# Patient Record
Sex: FEMALE | Race: WHITE | Employment: PART TIME | URBAN - METROPOLITAN AREA
[De-identification: names, ages, dates, MRNs, and addresses within clinical notes are randomized per-mention and may not be internally consistent; named-entity substitution may affect disease eponyms.]

---

## 2017-10-28 PROCEDURE — 87086 URINE CULTURE/COLONY COUNT: CPT | Performed by: FAMILY MEDICINE

## 2018-03-13 PROBLEM — Z86.19 HISTORY OF HERPES SIMPLEX INFECTION: Status: ACTIVE | Noted: 2018-03-13

## 2018-04-19 PROCEDURE — 88175 CYTOPATH C/V AUTO FLUID REDO: CPT | Performed by: INTERNAL MEDICINE

## 2018-04-19 PROCEDURE — 87624 HPV HI-RISK TYP POOLED RSLT: CPT | Performed by: INTERNAL MEDICINE

## 2018-11-19 ENCOUNTER — OFFICE VISIT (OUTPATIENT)
Dept: FAMILY MEDICINE CLINIC | Facility: CLINIC | Age: 43
End: 2018-11-19
Payer: COMMERCIAL

## 2018-11-19 DIAGNOSIS — Z11.1 SCREENING-PULMONARY TB: Primary | ICD-10-CM

## 2018-11-19 PROCEDURE — 86580 TB INTRADERMAL TEST: CPT | Performed by: NURSE PRACTITIONER

## 2018-11-19 NOTE — PATIENT INSTRUCTIONS
You will need to return to clinic in 48-72 hours to have results of TB test read. Please return to clinic on 11/21/2018 after 11:15 or until 4:00 pm. to have your TB test read.     If you do not return during this time your test will need to be repeated

## 2018-11-19 NOTE — PROGRESS NOTES
Gabriel Francois is a 39year old female who presents for TB testing. TUBERCULOSIS SCREENING QUESTIONNAIRE    · Live vaccines in the past month? no  · Any steroid medication in the past month? no  · History of BCG vaccine?     no  · If female, are you

## 2018-11-21 ENCOUNTER — OFFICE VISIT (OUTPATIENT)
Dept: FAMILY MEDICINE CLINIC | Facility: CLINIC | Age: 43
End: 2018-11-21

## 2018-11-21 DIAGNOSIS — Z11.1 ENCOUNTER FOR PPD SKIN TEST READING: Primary | ICD-10-CM

## 2018-11-21 NOTE — PROGRESS NOTES
Patient here for TB skin test read. The results were negative, 0 mm, no induration. Date given: 11/19/18  Date read: 11/21/18  Time Read: 12:10 PM    The patient had npo questions of concerns. Instructed her to follow up as needed.

## 2018-11-26 ENCOUNTER — OFFICE VISIT (OUTPATIENT)
Dept: FAMILY MEDICINE CLINIC | Facility: CLINIC | Age: 43
End: 2018-11-26
Payer: COMMERCIAL

## 2018-11-26 VITALS
DIASTOLIC BLOOD PRESSURE: 66 MMHG | SYSTOLIC BLOOD PRESSURE: 104 MMHG | HEART RATE: 76 BPM | RESPIRATION RATE: 16 BRPM | TEMPERATURE: 98 F

## 2018-11-26 DIAGNOSIS — N30.01 ACUTE CYSTITIS WITH HEMATURIA: Primary | ICD-10-CM

## 2018-11-26 PROCEDURE — 99213 OFFICE O/P EST LOW 20 MIN: CPT | Performed by: NURSE PRACTITIONER

## 2018-11-26 PROCEDURE — 87086 URINE CULTURE/COLONY COUNT: CPT | Performed by: NURSE PRACTITIONER

## 2018-11-26 RX ORDER — PHENAZOPYRIDINE HYDROCHLORIDE 200 MG/1
TABLET, FILM COATED ORAL
Qty: 6 TABLET | Refills: 0 | Status: SHIPPED | OUTPATIENT
Start: 2018-11-26 | End: 2019-09-06 | Stop reason: ALTCHOICE

## 2018-11-26 RX ORDER — NITROFURANTOIN 25; 75 MG/1; MG/1
CAPSULE ORAL
Qty: 14 CAPSULE | Refills: 0 | Status: SHIPPED | OUTPATIENT
Start: 2018-11-26 | End: 2019-09-06 | Stop reason: ALTCHOICE

## 2018-11-27 NOTE — PROGRESS NOTES
CHIEF COMPLAINT:   Patient presents with:  UTI    HPI:   Marc Mathur is a 39year old female who presents with symptoms of UTI. Patient states that she developed urinary urgency, frequency and burning 2 days ago. The urgency is extreme.  She also has : No suprapubic tenderness. No bladder distention, or CVAT     ASSESSMENT AND PLAN:   Shirlene Farr is a 39year old female presents with UTI symptoms.     Acute cystitis with hematuria  (primary encounter diagnosis)    Orders Placed This Encounter The infection causes inflammation in the urethra and bladder. This causes many of the symptoms.  The most common symptoms of a bladder infection are:  · Pain or burning when urinating  · Having to urinate more often than usual  · Urgent need to urinate  · O · You can use acetaminophen or ibuprofen for pain, fever, or discomfort, unless another medicine was prescribed. If you have chronic liver or kidney disease, talk with your healthcare provider before using these medicines.  Also talk with your provider if y · Fainting or loss of consciousness  · Rapid heart rate  When to seek medical advice  Call your healthcare provider right away if any of these occur:  · Fever of 100.4ºF (38. 0ºC) or higher, or as directed by your healthcare provider  · Symptoms are not bet

## 2019-09-03 PROCEDURE — 88305 TISSUE EXAM BY PATHOLOGIST: CPT | Performed by: RADIOLOGY

## 2019-09-03 PROCEDURE — 88344 IMHCHEM/IMCYTCHM EA MLT ANTB: CPT | Performed by: RADIOLOGY

## 2019-09-03 PROCEDURE — 88360 TUMOR IMMUNOHISTOCHEM/MANUAL: CPT | Performed by: RADIOLOGY

## 2019-09-06 ENCOUNTER — OFFICE VISIT (OUTPATIENT)
Dept: SURGERY | Facility: CLINIC | Age: 44
End: 2019-09-06
Payer: COMMERCIAL

## 2019-09-06 ENCOUNTER — GENETICS ENCOUNTER (OUTPATIENT)
Dept: GENETICS | Facility: HOSPITAL | Age: 44
End: 2019-09-06
Attending: SURGERY
Payer: COMMERCIAL

## 2019-09-06 ENCOUNTER — NURSE NAVIGATOR ENCOUNTER (OUTPATIENT)
Dept: HEMATOLOGY/ONCOLOGY | Facility: HOSPITAL | Age: 44
End: 2019-09-06

## 2019-09-06 ENCOUNTER — NURSE ONLY (OUTPATIENT)
Dept: HEMATOLOGY/ONCOLOGY | Facility: HOSPITAL | Age: 44
End: 2019-09-06
Attending: SURGERY
Payer: COMMERCIAL

## 2019-09-06 VITALS
OXYGEN SATURATION: 100 % | DIASTOLIC BLOOD PRESSURE: 79 MMHG | BODY MASS INDEX: 23.72 KG/M2 | HEART RATE: 72 BPM | SYSTOLIC BLOOD PRESSURE: 122 MMHG | RESPIRATION RATE: 16 BRPM | WEIGHT: 142.38 LBS | HEIGHT: 65 IN

## 2019-09-06 DIAGNOSIS — Z17.0 MALIGNANT NEOPLASM OF UPPER-OUTER QUADRANT OF LEFT BREAST IN FEMALE, ESTROGEN RECEPTOR POSITIVE (HCC): Primary | ICD-10-CM

## 2019-09-06 DIAGNOSIS — C50.412 MALIGNANT NEOPLASM OF UPPER-OUTER QUADRANT OF LEFT BREAST IN FEMALE, ESTROGEN RECEPTOR POSITIVE (HCC): Primary | ICD-10-CM

## 2019-09-06 PROCEDURE — 99205 OFFICE O/P NEW HI 60 MIN: CPT | Performed by: SURGERY

## 2019-09-06 PROCEDURE — 96040 HC GENETIC COUNSELING EA 30 MIN: CPT | Performed by: GENETIC COUNSELOR, MS

## 2019-09-06 PROCEDURE — 36415 COLL VENOUS BLD VENIPUNCTURE: CPT

## 2019-09-06 NOTE — PROGRESS NOTES
Referring Provider: Ambika Parker MD    Additional Provider: Keith Cheema MD    Reason for Referral:  Gera Fregoso was referred for genetic counseling because of a new diagnosis of breast cancer and a family history of breast cancer.   Ms. Lydia Stone the genes, BRCA1 and BRCA2, account for the majority of hereditary breast and ovarian cancer families.   Mutations in genes other than BRCA1/2, many of which now have medical management recommendations (e.g., CHEK2, MICA, RAD51C, RAD51D, PALB2) are identifie at increased risk would depend on the gene involved. Medical recommendations for individuals with BRCA1/2 pathogenic variants were reviewed as an example (see below).  It was also explained that for some of the genes for which testing is available, the asso and family history of breast cancer. Her reported family history is not highly suspicious for a hereditary cancer syndrome but is limited in that her father is an only child and her mother’s only sibling  in his 35s. Genetic testing on Ms. Lino fo

## 2019-09-09 ENCOUNTER — TELEPHONE (OUTPATIENT)
Dept: HEMATOLOGY/ONCOLOGY | Facility: HOSPITAL | Age: 44
End: 2019-09-09

## 2019-09-09 NOTE — TELEPHONE ENCOUNTER
Message left for patient asking that she return call to Breast RN Navigator concerning her inquiry regarding Medical Oncology at [address] [place] [address].

## 2019-09-10 ENCOUNTER — HOSPITAL ENCOUNTER (OUTPATIENT)
Dept: MRI IMAGING | Facility: HOSPITAL | Age: 44
Discharge: HOME | End: 2019-09-10
Attending: SURGERY
Payer: COMMERCIAL

## 2019-09-10 ENCOUNTER — OFFICE VISIT (OUTPATIENT)
Dept: SURGERY | Facility: CLINIC | Age: 44
End: 2019-09-10
Payer: COMMERCIAL

## 2019-09-10 DIAGNOSIS — C50.912 MALIGNANT NEOPLASM OF LEFT FEMALE BREAST, UNSPECIFIED ESTROGEN RECEPTOR STATUS, UNSPECIFIED SITE OF BREAST (HCC): Primary | ICD-10-CM

## 2019-09-10 DIAGNOSIS — C50.412 MALIGNANT NEOPLASM OF UPPER-OUTER QUADRANT OF LEFT BREAST IN FEMALE, ESTROGEN RECEPTOR POSITIVE (HCC): ICD-10-CM

## 2019-09-10 DIAGNOSIS — Z17.0 MALIGNANT NEOPLASM OF UPPER-OUTER QUADRANT OF LEFT BREAST IN FEMALE, ESTROGEN RECEPTOR POSITIVE (HCC): ICD-10-CM

## 2019-09-10 PROCEDURE — A9575 INJ GADOTERATE MEGLUMI 0.1ML: HCPCS | Performed by: SURGERY

## 2019-09-10 PROCEDURE — 77049 MRI BREAST C-+ W/CAD BI: CPT | Performed by: SURGERY

## 2019-09-10 PROCEDURE — 99203 OFFICE O/P NEW LOW 30 MIN: CPT | Performed by: SURGERY

## 2019-09-10 NOTE — CONSULTS
New Patient Consultation    This is the first visit for this 38year old female who presents to discuss reconstructive options following surgery for breast cancer. History of Present Illness:    The patient is a 38year old female who presents with a lef Review of Systems:    General:   The patient denies, fever, chills, night sweats, +fatigue, generalized weakness, change in appetite, weight loss, or weight gain. Endocrine:   There is no history of poor/slow wound healing, weight loss/gain, fe aches/pain, joint pain, stiff joints, neck pain, back pain or bone pain.     Neurologic:  There is no history of migraines or severe headaches, seizure/epilepsy, speech problems, coordination problems, trembling/tremors, fainting/black outs, dizziness, tomasa appears normal. There are no suspicious appearing rashes or lesions. Extremities: The extremities are without deformity, cyanosis or edema.     Impression:   The patient is a 38year old female who presents with a left breast cancer is considering bilate operative findings. She also understand the possible need for revision surgery if direct to implant reconstruction is undertaken.  The expected post-operative course was discussed including the need for activity limitation, drains, and suture removal.  We d

## 2019-09-12 ENCOUNTER — TELEPHONE (OUTPATIENT)
Dept: SURGERY | Facility: CLINIC | Age: 44
End: 2019-09-12

## 2019-09-12 NOTE — TELEPHONE ENCOUNTER
Pt phoned in upset about the [facility] imaging needed for her MRI read. I explained to her that we requested them the initial day of her consultation, and have been in contact for the request multiple times.    I let her know Gomez Look spoke to someone today, monique meek

## 2019-09-16 ENCOUNTER — TELEPHONE (OUTPATIENT)
Dept: SURGERY | Facility: CLINIC | Age: 44
End: 2019-09-16

## 2019-09-16 NOTE — TELEPHONE ENCOUNTER
DEREKM returning her call regarding the MRI result. I let her know we don't have the results yet, but I did confirm with radiology they have the [facility] images they needed, and expect the result hopefully by tomorrow.    I let her know we would call as soon as w

## 2019-09-18 ENCOUNTER — TELEPHONE (OUTPATIENT)
Dept: SURGERY | Facility: CLINIC | Age: 44
End: 2019-09-18

## 2019-09-18 ENCOUNTER — GENETICS ENCOUNTER (OUTPATIENT)
Dept: HEMATOLOGY/ONCOLOGY | Facility: HOSPITAL | Age: 44
End: 2019-09-18

## 2019-09-18 NOTE — TELEPHONE ENCOUNTER
Pt's questions addressed regarding the chest muscle. I let her know I confirmed with Dr Angel Shirley, and it is not growing into the chest muscle. There is a plane between the mass and her chest wall. Pt verbalized understanding.    Other questions Tigist Marcano

## 2019-09-18 NOTE — TELEPHONE ENCOUNTER
I contacted the patient regarding the MRI result, and the message from Dr Ashly Pacheco. Reviewed that with the findings, Dr Ashly Pacheco recommends a mastectomy on the left to be safest.  Her questions addressed regarding reconstruction. She had questions about the kin

## 2019-09-18 NOTE — PROGRESS NOTES
Referring Provider:       Tierra Davies MD     Additional Provider:     Emy Moya MD    Reason for Referral:  Kathryn Hadley had genetic testing performed on 9/6/19 because of a new diagnosis of breast cancer.      Genetic Testing Result:  No pat SMAD4, SMARCA4, STK11, TP53, TSC1, TSC2, and VHL. Please refer to the report from [facility] for additional testing information. These results were phoned to Ms. Lino on 9/24/19. The etiology of Ms. Lino’s breast cancer remains unexplained.  The l

## 2019-09-20 ENCOUNTER — TELEPHONE (OUTPATIENT)
Dept: HEMATOLOGY/ONCOLOGY | Facility: HOSPITAL | Age: 44
End: 2019-09-20

## 2019-09-20 ENCOUNTER — TELEPHONE (OUTPATIENT)
Dept: SURGERY | Facility: CLINIC | Age: 44
End: 2019-09-20

## 2019-09-20 DIAGNOSIS — Z17.0 MALIGNANT NEOPLASM OF UPPER-OUTER QUADRANT OF LEFT BREAST IN FEMALE, ESTROGEN RECEPTOR POSITIVE (HCC): ICD-10-CM

## 2019-09-20 DIAGNOSIS — C50.912 MALIGNANT NEOPLASM OF LEFT FEMALE BREAST, UNSPECIFIED ESTROGEN RECEPTOR STATUS, UNSPECIFIED SITE OF BREAST (HCC): Primary | ICD-10-CM

## 2019-09-20 DIAGNOSIS — C50.412 MALIGNANT NEOPLASM OF UPPER-OUTER QUADRANT OF LEFT BREAST IN FEMALE, ESTROGEN RECEPTOR POSITIVE (HCC): ICD-10-CM

## 2019-09-20 PROBLEM — C50.812 MALIGNANT NEOPLASM OF OVERLAPPING SITES OF LEFT BREAST IN FEMALE, ESTROGEN RECEPTOR POSITIVE: Status: ACTIVE | Noted: 2019-09-20

## 2019-09-20 PROBLEM — C50.812 MALIGNANT NEOPLASM OF OVERLAPPING SITES OF LEFT BREAST IN FEMALE, ESTROGEN RECEPTOR POSITIVE (HCC): Status: ACTIVE | Noted: 2019-09-20

## 2019-09-20 NOTE — TELEPHONE ENCOUNTER
I contacted the patient to offer Oct 10 at [facility].   She is seeing Katarzyna Aquino on Monday for her pre op with plastics, and will call her PCP now that she has a date for surgery. Questions addressed regarding recovery, and talked about drains.    Emotional sup

## 2019-09-20 NOTE — TELEPHONE ENCOUNTER
Spoke with patient regarding upcoming surgical procedure. Pt states she is going to proceed with B mastectomy with reconstruction, she may want to go up in cup size with reconstruction.  Encouraged pt to discuss this with plastic surgery, she is meeting wit

## 2019-09-23 ENCOUNTER — TELEPHONE (OUTPATIENT)
Dept: SURGERY | Facility: CLINIC | Age: 44
End: 2019-09-23

## 2019-09-23 ENCOUNTER — OFFICE VISIT (OUTPATIENT)
Dept: SURGERY | Facility: CLINIC | Age: 44
End: 2019-09-23
Payer: COMMERCIAL

## 2019-09-23 DIAGNOSIS — C50.912 MALIGNANT NEOPLASM OF LEFT FEMALE BREAST, UNSPECIFIED ESTROGEN RECEPTOR STATUS, UNSPECIFIED SITE OF BREAST (HCC): Primary | ICD-10-CM

## 2019-09-23 DIAGNOSIS — Z17.0 MALIGNANT NEOPLASM OF OVERLAPPING SITES OF LEFT BREAST IN FEMALE, ESTROGEN RECEPTOR POSITIVE (HCC): Primary | ICD-10-CM

## 2019-09-23 DIAGNOSIS — C50.812 MALIGNANT NEOPLASM OF OVERLAPPING SITES OF LEFT BREAST IN FEMALE, ESTROGEN RECEPTOR POSITIVE (HCC): Primary | ICD-10-CM

## 2019-09-23 PROCEDURE — 99212 OFFICE O/P EST SF 10 MIN: CPT | Performed by: PHYSICIAN ASSISTANT

## 2019-09-23 NOTE — PATIENT INSTRUCTIONS
Surgeon:         Dr. Nazia Payan                                        Tel:        [phone]                                  Fax:        [phone]    Surgery/Procedure:                              Bilateral nipple sparing mastectomy and left sent

## 2019-09-23 NOTE — PROGRESS NOTES
This is a 26-year-old female who presents today for preoperative discussion of her upcoming mastectomy and reconstruction. Dr. Bharath Higginbotham is her breast surgeon and the patient has now chosen to undergo bilateral mastectomy.   She has a known left breast cancer deemed oncologically safe by Dr. Arlet Pham then this would be fine with us.   We spent time talking about implant based reconstruction as it was already previously discussed at her consult visit that she is not a good candidate for autologous reconstruction du

## 2019-09-23 NOTE — TELEPHONE ENCOUNTER
Pt phoned in with questions about her nipple. I let her know if it looks close, they will sample it in the OR. Also depends on her anatomy regarding leaving her nipple.    She also had questions about requesting an anesthesiologist. I let her know I don't

## 2019-10-10 ENCOUNTER — HOSPITAL ENCOUNTER (OUTPATIENT)
Dept: NUCLEAR MEDICINE | Facility: HOSPITAL | Age: 44
Discharge: HOME | End: 2019-10-10
Attending: SURGERY
Payer: COMMERCIAL

## 2019-10-10 ENCOUNTER — ANESTHESIA (OUTPATIENT)
Dept: SURGERY | Facility: HOSPITAL | Age: 44
End: 2019-10-10
Payer: COMMERCIAL

## 2019-10-10 ENCOUNTER — ANESTHESIA EVENT (OUTPATIENT)
Dept: SURGERY | Facility: HOSPITAL | Age: 44
End: 2019-10-10
Payer: COMMERCIAL

## 2019-10-10 ENCOUNTER — HOSPITAL ENCOUNTER (OUTPATIENT)
Facility: HOSPITAL | Age: 44
Discharge: HOME | End: 2019-10-11
Attending: SURGERY | Admitting: SURGERY
Payer: COMMERCIAL

## 2019-10-10 DIAGNOSIS — Z17.0 MALIGNANT NEOPLASM OF UPPER-OUTER QUADRANT OF LEFT BREAST IN FEMALE, ESTROGEN RECEPTOR POSITIVE (HCC): ICD-10-CM

## 2019-10-10 DIAGNOSIS — C50.912 MALIGNANT NEOPLASM OF LEFT FEMALE BREAST, UNSPECIFIED ESTROGEN RECEPTOR STATUS, UNSPECIFIED SITE OF BREAST (HCC): ICD-10-CM

## 2019-10-10 DIAGNOSIS — C50.412 MALIGNANT NEOPLASM OF UPPER-OUTER QUADRANT OF LEFT BREAST IN FEMALE, ESTROGEN RECEPTOR POSITIVE (HCC): ICD-10-CM

## 2019-10-10 DIAGNOSIS — C50.412 MALIGNANT NEOPLASM OF UPPER-OUTER QUADRANT OF LEFT FEMALE BREAST, UNSPECIFIED ESTROGEN RECEPTOR STATUS (HCC): ICD-10-CM

## 2019-10-10 PROCEDURE — 88309 TISSUE EXAM BY PATHOLOGIST: CPT | Performed by: SURGERY

## 2019-10-10 PROCEDURE — 88307 TISSUE EXAM BY PATHOLOGIST: CPT | Performed by: SURGERY

## 2019-10-10 PROCEDURE — 88332 PATH CONSLTJ SURG EA ADD BLK: CPT | Performed by: SURGERY

## 2019-10-10 PROCEDURE — 88331 PATH CONSLTJ SURG 1 BLK 1SPC: CPT | Performed by: SURGERY

## 2019-10-10 PROCEDURE — 88342 IMHCHEM/IMCYTCHM 1ST ANTB: CPT | Performed by: SURGERY

## 2019-10-10 PROCEDURE — 78195 LYMPH SYSTEM IMAGING: CPT | Performed by: SURGERY

## 2019-10-10 PROCEDURE — 3E0W3KZ INTRODUCTION OF OTHER DIAGNOSTIC SUBSTANCE INTO LYMPHATICS, PERCUTANEOUS APPROACH: ICD-10-PCS | Performed by: SURGERY

## 2019-10-10 PROCEDURE — 0HHV0NZ INSERTION OF TISSUE EXPANDER INTO BILATERAL BREAST, OPEN APPROACH: ICD-10-PCS | Performed by: SURGERY

## 2019-10-10 PROCEDURE — 88341 IMHCHEM/IMCYTCHM EA ADD ANTB: CPT | Performed by: SURGERY

## 2019-10-10 PROCEDURE — 88334 PATH CONSLTJ SURG CYTO XM EA: CPT | Performed by: SURGERY

## 2019-10-10 PROCEDURE — 0HUV0KZ SUPPLEMENT BILATERAL BREAST WITH NONAUTOLOGOUS TISSUE SUBSTITUTE, OPEN APPROACH: ICD-10-PCS | Performed by: SURGERY

## 2019-10-10 PROCEDURE — 88360 TUMOR IMMUNOHISTOCHEM/MANUAL: CPT | Performed by: SURGERY

## 2019-10-10 PROCEDURE — 81025 URINE PREGNANCY TEST: CPT

## 2019-10-10 PROCEDURE — 0HTV0ZZ RESECTION OF BILATERAL BREAST, OPEN APPROACH: ICD-10-PCS | Performed by: SURGERY

## 2019-10-10 PROCEDURE — 88363 XM ARCHIVE TISSUE MOLEC ANAL: CPT | Performed by: SURGERY

## 2019-10-10 PROCEDURE — 88305 TISSUE EXAM BY PATHOLOGIST: CPT | Performed by: SURGERY

## 2019-10-10 PROCEDURE — 07T60ZZ RESECTION OF LEFT AXILLARY LYMPHATIC, OPEN APPROACH: ICD-10-PCS | Performed by: SURGERY

## 2019-10-10 DEVICE — MTRX ALDRM SLCT TISS THK.4-1.6: Type: IMPLANTABLE DEVICE | Status: FUNCTIONAL

## 2019-10-10 RX ORDER — MORPHINE SULFATE 4 MG/ML
2 INJECTION, SOLUTION INTRAMUSCULAR; INTRAVENOUS EVERY 10 MIN PRN
Status: DISCONTINUED | OUTPATIENT
Start: 2019-10-10 | End: 2019-10-10 | Stop reason: HOSPADM

## 2019-10-10 RX ORDER — HALOPERIDOL 5 MG/ML
0.25 INJECTION INTRAMUSCULAR ONCE AS NEEDED
Status: DISCONTINUED | OUTPATIENT
Start: 2019-10-10 | End: 2019-10-10 | Stop reason: HOSPADM

## 2019-10-10 RX ORDER — FLUOXETINE HYDROCHLORIDE 20 MG/1
20 CAPSULE ORAL DAILY
Status: DISCONTINUED | OUTPATIENT
Start: 2019-10-10 | End: 2019-10-11

## 2019-10-10 RX ORDER — ROCURONIUM BROMIDE 10 MG/ML
INJECTION, SOLUTION INTRAVENOUS AS NEEDED
Status: DISCONTINUED | OUTPATIENT
Start: 2019-10-10 | End: 2019-10-10 | Stop reason: SURG

## 2019-10-10 RX ORDER — DEXTROSE, SODIUM CHLORIDE, SODIUM LACTATE, POTASSIUM CHLORIDE, AND CALCIUM CHLORIDE 5; .6; .31; .03; .02 G/100ML; G/100ML; G/100ML; G/100ML; G/100ML
INJECTION, SOLUTION INTRAVENOUS CONTINUOUS
Status: DISCONTINUED | OUTPATIENT
Start: 2019-10-10 | End: 2019-10-11

## 2019-10-10 RX ORDER — GLYCOPYRROLATE 0.2 MG/ML
INJECTION INTRAMUSCULAR; INTRAVENOUS AS NEEDED
Status: DISCONTINUED | OUTPATIENT
Start: 2019-10-10 | End: 2019-10-10 | Stop reason: SURG

## 2019-10-10 RX ORDER — HYDROMORPHONE HYDROCHLORIDE 1 MG/ML
0.2 INJECTION, SOLUTION INTRAMUSCULAR; INTRAVENOUS; SUBCUTANEOUS EVERY 5 MIN PRN
Status: DISCONTINUED | OUTPATIENT
Start: 2019-10-10 | End: 2019-10-10 | Stop reason: HOSPADM

## 2019-10-10 RX ORDER — NALOXONE HYDROCHLORIDE 0.4 MG/ML
80 INJECTION, SOLUTION INTRAMUSCULAR; INTRAVENOUS; SUBCUTANEOUS AS NEEDED
Status: DISCONTINUED | OUTPATIENT
Start: 2019-10-10 | End: 2019-10-10 | Stop reason: HOSPADM

## 2019-10-10 RX ORDER — MORPHINE SULFATE 4 MG/ML
4 INJECTION, SOLUTION INTRAMUSCULAR; INTRAVENOUS EVERY 10 MIN PRN
Status: DISCONTINUED | OUTPATIENT
Start: 2019-10-10 | End: 2019-10-10 | Stop reason: HOSPADM

## 2019-10-10 RX ORDER — SODIUM CHLORIDE, SODIUM LACTATE, POTASSIUM CHLORIDE, CALCIUM CHLORIDE 600; 310; 30; 20 MG/100ML; MG/100ML; MG/100ML; MG/100ML
INJECTION, SOLUTION INTRAVENOUS CONTINUOUS
Status: DISCONTINUED | OUTPATIENT
Start: 2019-10-10 | End: 2019-10-11

## 2019-10-10 RX ORDER — HYDROCODONE BITARTRATE AND ACETAMINOPHEN 5; 325 MG/1; MG/1
1 TABLET ORAL AS NEEDED
Status: DISCONTINUED | OUTPATIENT
Start: 2019-10-10 | End: 2019-10-10 | Stop reason: HOSPADM

## 2019-10-10 RX ORDER — BUPIVACAINE HYDROCHLORIDE 5 MG/ML
INJECTION, SOLUTION EPIDURAL; INTRACAUDAL AS NEEDED
Status: DISCONTINUED | OUTPATIENT
Start: 2019-10-10 | End: 2019-10-10 | Stop reason: HOSPADM

## 2019-10-10 RX ORDER — NEOSTIGMINE METHYLSULFATE 0.5 MG/ML
INJECTION INTRAVENOUS AS NEEDED
Status: DISCONTINUED | OUTPATIENT
Start: 2019-10-10 | End: 2019-10-10 | Stop reason: SURG

## 2019-10-10 RX ORDER — HYDROMORPHONE HYDROCHLORIDE 1 MG/ML
INJECTION, SOLUTION INTRAMUSCULAR; INTRAVENOUS; SUBCUTANEOUS AS NEEDED
Status: DISCONTINUED | OUTPATIENT
Start: 2019-10-10 | End: 2019-10-10 | Stop reason: SURG

## 2019-10-10 RX ORDER — HYDROMORPHONE HYDROCHLORIDE 1 MG/ML
0.6 INJECTION, SOLUTION INTRAMUSCULAR; INTRAVENOUS; SUBCUTANEOUS EVERY 5 MIN PRN
Status: DISCONTINUED | OUTPATIENT
Start: 2019-10-10 | End: 2019-10-10 | Stop reason: HOSPADM

## 2019-10-10 RX ORDER — ONDANSETRON 2 MG/ML
4 INJECTION INTRAMUSCULAR; INTRAVENOUS ONCE AS NEEDED
Status: DISCONTINUED | OUTPATIENT
Start: 2019-10-10 | End: 2019-10-10 | Stop reason: HOSPADM

## 2019-10-10 RX ORDER — MIDAZOLAM HYDROCHLORIDE 1 MG/ML
INJECTION INTRAMUSCULAR; INTRAVENOUS AS NEEDED
Status: DISCONTINUED | OUTPATIENT
Start: 2019-10-10 | End: 2019-10-10 | Stop reason: SURG

## 2019-10-10 RX ORDER — SODIUM CHLORIDE, SODIUM LACTATE, POTASSIUM CHLORIDE, CALCIUM CHLORIDE 600; 310; 30; 20 MG/100ML; MG/100ML; MG/100ML; MG/100ML
INJECTION, SOLUTION INTRAVENOUS CONTINUOUS
Status: DISCONTINUED | OUTPATIENT
Start: 2019-10-10 | End: 2019-10-10 | Stop reason: HOSPADM

## 2019-10-10 RX ORDER — HYDROCODONE BITARTRATE AND ACETAMINOPHEN 5; 325 MG/1; MG/1
2 TABLET ORAL AS NEEDED
Status: DISCONTINUED | OUTPATIENT
Start: 2019-10-10 | End: 2019-10-10 | Stop reason: HOSPADM

## 2019-10-10 RX ORDER — MORPHINE SULFATE 10 MG/ML
6 INJECTION, SOLUTION INTRAMUSCULAR; INTRAVENOUS EVERY 10 MIN PRN
Status: DISCONTINUED | OUTPATIENT
Start: 2019-10-10 | End: 2019-10-10 | Stop reason: HOSPADM

## 2019-10-10 RX ORDER — HYDROMORPHONE HYDROCHLORIDE 1 MG/ML
0.4 INJECTION, SOLUTION INTRAMUSCULAR; INTRAVENOUS; SUBCUTANEOUS EVERY 5 MIN PRN
Status: DISCONTINUED | OUTPATIENT
Start: 2019-10-10 | End: 2019-10-10 | Stop reason: HOSPADM

## 2019-10-10 RX ORDER — CEFAZOLIN SODIUM/WATER 2 G/20 ML
2 SYRINGE (ML) INTRAVENOUS ONCE
Status: COMPLETED | OUTPATIENT
Start: 2019-10-10 | End: 2019-10-10

## 2019-10-10 RX ORDER — ONDANSETRON 2 MG/ML
4 INJECTION INTRAMUSCULAR; INTRAVENOUS EVERY 6 HOURS PRN
Status: DISCONTINUED | OUTPATIENT
Start: 2019-10-10 | End: 2019-10-11

## 2019-10-10 RX ORDER — ACETAMINOPHEN 500 MG
1000 TABLET ORAL ONCE
Status: COMPLETED | OUTPATIENT
Start: 2019-10-10 | End: 2019-10-10

## 2019-10-10 RX ORDER — HYDROCODONE BITARTRATE AND ACETAMINOPHEN 5; 325 MG/1; MG/1
2 TABLET ORAL EVERY 4 HOURS PRN
Status: DISCONTINUED | OUTPATIENT
Start: 2019-10-10 | End: 2019-10-11

## 2019-10-10 RX ORDER — FAMOTIDINE 20 MG/1
20 TABLET ORAL ONCE
Status: DISCONTINUED | OUTPATIENT
Start: 2019-10-10 | End: 2019-10-10 | Stop reason: HOSPADM

## 2019-10-10 RX ORDER — METHYLENE BLUE 10 MG/ML
INJECTION INTRAVENOUS AS NEEDED
Status: DISCONTINUED | OUTPATIENT
Start: 2019-10-10 | End: 2019-10-10 | Stop reason: HOSPADM

## 2019-10-10 RX ORDER — ENOXAPARIN SODIUM 100 MG/ML
40 INJECTION SUBCUTANEOUS DAILY
Status: DISCONTINUED | OUTPATIENT
Start: 2019-10-11 | End: 2019-10-11

## 2019-10-10 RX ORDER — DEXAMETHASONE SODIUM PHOSPHATE 4 MG/ML
VIAL (ML) INJECTION AS NEEDED
Status: DISCONTINUED | OUTPATIENT
Start: 2019-10-10 | End: 2019-10-10 | Stop reason: SURG

## 2019-10-10 RX ORDER — PROCHLORPERAZINE EDISYLATE 5 MG/ML
5 INJECTION INTRAMUSCULAR; INTRAVENOUS ONCE AS NEEDED
Status: DISCONTINUED | OUTPATIENT
Start: 2019-10-10 | End: 2019-10-10 | Stop reason: HOSPADM

## 2019-10-10 RX ORDER — LIDOCAINE HYDROCHLORIDE 10 MG/ML
INJECTION, SOLUTION EPIDURAL; INFILTRATION; INTRACAUDAL; PERINEURAL AS NEEDED
Status: DISCONTINUED | OUTPATIENT
Start: 2019-10-10 | End: 2019-10-10 | Stop reason: SURG

## 2019-10-10 RX ORDER — DOCUSATE SODIUM 100 MG/1
100 CAPSULE, LIQUID FILLED ORAL 2 TIMES DAILY
Status: DISCONTINUED | OUTPATIENT
Start: 2019-10-10 | End: 2019-10-11

## 2019-10-10 RX ORDER — HYDROCODONE BITARTRATE AND ACETAMINOPHEN 5; 325 MG/1; MG/1
1 TABLET ORAL EVERY 4 HOURS PRN
Status: DISCONTINUED | OUTPATIENT
Start: 2019-10-10 | End: 2019-10-11

## 2019-10-10 RX ORDER — MORPHINE SULFATE 2 MG/ML
2 INJECTION, SOLUTION INTRAMUSCULAR; INTRAVENOUS EVERY 2 HOUR PRN
Status: DISCONTINUED | OUTPATIENT
Start: 2019-10-10 | End: 2019-10-11

## 2019-10-10 RX ADMIN — MIDAZOLAM HYDROCHLORIDE 2 MG: 1 INJECTION INTRAMUSCULAR; INTRAVENOUS at 12:05:00

## 2019-10-10 RX ADMIN — DEXAMETHASONE SODIUM PHOSPHATE 8 MG: 4 MG/ML VIAL (ML) INJECTION at 12:09:00

## 2019-10-10 RX ADMIN — NEOSTIGMINE METHYLSULFATE 2 MG: 0.5 INJECTION INTRAVENOUS at 15:54:00

## 2019-10-10 RX ADMIN — CEFAZOLIN SODIUM/WATER 2 G: 2 G/20 ML SYRINGE (ML) INTRAVENOUS at 12:20:00

## 2019-10-10 RX ADMIN — GLYCOPYRROLATE 0.4 MG: 0.2 INJECTION INTRAMUSCULAR; INTRAVENOUS at 15:54:00

## 2019-10-10 RX ADMIN — HYDROMORPHONE HYDROCHLORIDE 1 MG: 1 INJECTION, SOLUTION INTRAMUSCULAR; INTRAVENOUS; SUBCUTANEOUS at 13:10:00

## 2019-10-10 RX ADMIN — ROCURONIUM BROMIDE 50 MG: 10 INJECTION, SOLUTION INTRAVENOUS at 12:09:00

## 2019-10-10 RX ADMIN — LIDOCAINE HYDROCHLORIDE 50 MG: 10 INJECTION, SOLUTION EPIDURAL; INFILTRATION; INTRACAUDAL; PERINEURAL at 12:09:00

## 2019-10-10 NOTE — H&P
History of Present Illness:   Ms. Toy Montez is a 38year old woman who presents with imaging detected left breast cancer.   The patient denies any palpable masses, nipple discharge, skin changes or axillary symptoms she does have a family history of br 08/25/19  She has history of oral contraceptive use for 3 years, last 1997. She denies infertility treatment to achieve pregnancy.     Medications:       valACYclovir HCl 500 MG Oral Tab Take 1 tablet (500 mg total) by mouth daily.  Disp: 90 tablet Rfl: 3 present illness     Gastrointestinal:     There is no history of difficulty or pain with swallowing, reflux symptoms, vomiting, dark or bloody stools,+ constipation, yellowing of the skin, indigestion, nausea, change in bowel habits, diarrhea, abdominal pa appropriate.     HEENT: The head is normocephalic. The neck is supple. The thyroid is not enlarged and is without palpable masses/nodules. There are no palpable masses. The trachea is in the midline.  Conjunctiva are clear, non-icteric.     Chest: The chest cancer were discussed with MsPriya Abena Gimenez and her family, including the difference between in-situ and invasive carcinoma, and the distinction between local and systemic disease and local and systemic therapy.  For local treatment options, I explained t discuss her reconstructive options. She believes she is motivated for a bilateral nipple versus skin sparing mastectomy with left sentinel lymph node biopsy and possible left axillary lymph node dissection.  The risks and possible complications of the proc

## 2019-10-10 NOTE — PROGRESS NOTES
Plan for bilateral mastectomy by Dr. Concepción Durham and immediate reconstruction with tissue expander and acellular dermal matrix reviewed with patient.  The risks of surgery including but not limited to bleeding, infection, scarring, delayed wound healing, seroma,

## 2019-10-10 NOTE — PROGRESS NOTES
Breast Surgery New Patient Consultation    This is the first visit for this 38year old woman, referred by Dr. Leslie Maharaj, who presents for evaluation of breast cancer.     History of Present Illness:   Ms. Justina Sánchez is a 38year old woman who present time of first pregnancy. She has cumulative breastfeeding history of 1 months, last unknown. She achieved menarche at age 14 and LMP  LMP: 08/25/19  She has history of oral contraceptive use for 3 years, last 1997.   She denies infertility treatment to flat, SOB/Coughing at night, swelling of the legs or chest pain while walking.     Breasts:  See history of present illness    Gastrointestinal:     There is no history of difficulty or pain with swallowing, reflux symptoms, vomiting, dark or bloody stools, her stated age. Her speech patterns and movements are normal. Her affect is appropriate. HEENT: The head is normocephalic. The neck is supple. The thyroid is not enlarged and is without palpable masses/nodules. There are no palpable masses.  The trachea this at length. The natural history and evolution of breast cancer were discussed with Ms. Dorene Leach and her family, including the difference between in-situ and invasive carcinoma, and the distinction between local and systemic disease and local a therefore I recommended that she meet with plastic surgery to discuss her reconstructive options.   She believes she is motivated for a bilateral nipple versus skin sparing mastectomy with left sentinel lymph node biopsy and possible left axillary lymph nod

## 2019-10-10 NOTE — OPERATIVE REPORT
PREOPERATIVE DIAGNOSIS: Left breast cancer with acquired absence of bilateral breasts. POSTOPERATIVE DIAGNOSIS: Left breast cancer with acquired absence of bilateral breasts.   PROCEDURE PERFORMED: Immediate bilateral breast reconstruction with tissue expa appeared of suitable thickness of viability to facilitate immediate reconstruction. The pockets were irrigated with warm saline irrigation until all particulate fat was evacuated. Hemostasis was then secured with electrocautery.   Next, the pockets were ir permitted tension-free closure of the skin edges. The skin was then closed with interrupted 3-0 Vicryl deep dermal suture and running 4-0 Monocryl subcuticular suture. The drain sites were dressed with BioPatch and Tegaderm.  The incisions dressed with

## 2019-10-10 NOTE — ANESTHESIA PREPROCEDURE EVALUATION
Anesthesia PreOp Note    HPI:     Ariela Hunter is a 38year old female who presents for preoperative consultation requested by: Elier Lewis MD    Date of Surgery: 10/10/2019    Procedure(s):  BREAST MASTECTOMY WITH PLASTIC SURGEON RECONSTRUCTION Tab Take 1 tablet (20 mg total) by mouth daily. Disp: 90 tablet Rfl: 3 Past Week at Unknown time   valACYclovir HCl 500 MG Oral Tab Take 1 tablet (500 mg total) by mouth daily.  Disp: 90 tablet Rfl: 3 Past Week at Unknown time       Current Facility-Adminis file        Active member of club or organization: Not on file        Attends meetings of clubs or organizations: Not on file        Relationship status: Not on file      Intimate partner violence:        Fear of current or ex partner: Not on file        E Comments: IBS    Endo/Other    (-) diabetes mellitus, hypothyroidism  Abdominal  - normal exam    Abdomen: soft.   Bowel sounds: normal.     Other findings: veniers           Anesthesia Plan:   ASA:  2  Plan:   General  Airway:  ETT and Video laryngosco

## 2019-10-10 NOTE — BRIEF OP NOTE
Pre-Operative Diagnosis: Malignant neoplasm of left female breast, unspecified estrogen receptor status, unspecified site of breast ([facility] [address] [C50.912]  Malignant neoplasm of upper-outer quadrant of left breast in female, estrogen receptor positive ([facility] [address] [C50.

## 2019-10-10 NOTE — ANESTHESIA POSTPROCEDURE EVALUATION
Patient:  Jayde Thompsontto    Procedure Summary     Date:  10/10/19 Room / Location:  [address] [place] [address] MAIN OR 03 / [address] [facility] MAIN OR    Anesthesia Start:  1205 Anesthesia Stop:  9337    Procedures:       BREAST MASTECTOMY WITH PLASTIC SURGEON RECONSTRUCTION (Bilateral Breast)

## 2019-10-11 VITALS
HEART RATE: 68 BPM | SYSTOLIC BLOOD PRESSURE: 109 MMHG | HEIGHT: 65 IN | BODY MASS INDEX: 23.16 KG/M2 | RESPIRATION RATE: 16 BRPM | OXYGEN SATURATION: 96 % | TEMPERATURE: 99 F | DIASTOLIC BLOOD PRESSURE: 65 MMHG | WEIGHT: 139 LBS

## 2019-10-11 RX ORDER — HYDROCODONE BITARTRATE AND ACETAMINOPHEN 5; 325 MG/1; MG/1
1-2 TABLET ORAL EVERY 6 HOURS PRN
Qty: 40 TABLET | Refills: 0 | Status: SHIPPED | OUTPATIENT
Start: 2019-10-11 | End: 2019-10-16

## 2019-10-11 RX ORDER — DOCUSATE SODIUM 100 MG/1
100 CAPSULE, LIQUID FILLED ORAL 2 TIMES DAILY
Qty: 30 CAPSULE | Refills: 1 | Status: SHIPPED | OUTPATIENT
Start: 2019-10-11 | End: 2019-11-01

## 2019-10-11 RX ORDER — CEPHALEXIN 500 MG/1
500 CAPSULE ORAL 4 TIMES DAILY
Qty: 40 CAPSULE | Refills: 1 | Status: SHIPPED | OUTPATIENT
Start: 2019-10-11 | End: 2019-10-16

## 2019-10-11 RX ORDER — ONDANSETRON 4 MG/1
4 TABLET, ORALLY DISINTEGRATING ORAL EVERY 4 HOURS PRN
Qty: 12 TABLET | Refills: 0 | Status: SHIPPED | OUTPATIENT
Start: 2019-10-11 | End: 2019-11-01

## 2019-10-11 NOTE — PROGRESS NOTES
Pain well-controlled  Drains 160cc in 24hrs  Awake and alert  Mastectomy skin well-perfused  Drain effluent serosanguinous  A/P:  POD# 1  Doing well  Plan d/c home later today  Home care instructions reviewed with pt and questions answered

## 2019-10-11 NOTE — PLAN OF CARE
Problem: Patient Centered Care  Goal: Patient preferences are identified and integrated in the patient's plan of care  Description  Interventions:  - What would you like us to know as we care for you?  I heard the drains were hard to empty, but they aren' Anticipate increased pain with activity and pre-medicate as appropriate  Outcome: Progressing     Problem: RISK FOR INFECTION - ADULT  Goal: Absence of fever/infection during anticipated neutropenic period  Description  INTERVENTIONS  - Monitor WBC  - Admi

## 2019-10-14 ENCOUNTER — TELEPHONE (OUTPATIENT)
Dept: HEMATOLOGY/ONCOLOGY | Facility: HOSPITAL | Age: 44
End: 2019-10-14

## 2019-10-14 NOTE — TELEPHONE ENCOUNTER
Phoned patient for care coordination. Patient requesting a medical oncology consult with Dr. Anabela Morataya. Appointment scheduled for 10/25/19 at 8am.  All appointment information provided to patient. She acknowledged and denied questions.

## 2019-10-15 ENCOUNTER — NURSE NAVIGATOR ENCOUNTER (OUTPATIENT)
Dept: HEMATOLOGY/ONCOLOGY | Facility: HOSPITAL | Age: 44
End: 2019-10-15

## 2019-10-15 ENCOUNTER — TELEPHONE (OUTPATIENT)
Dept: SURGERY | Facility: CLINIC | Age: 44
End: 2019-10-15

## 2019-10-15 NOTE — PROGRESS NOTES
Breast Surgery Post-Operative Visit    Diagnosis:   Left invasive ductal carinoma status post bilateral nipple sparing mastectomies with left axillary lymph node dissection and immediate bilateral reconstruction with expander implant Jacki Juárez on October 10 dissection and expander placement. She has bilateral productive drains. She denies any fevers, chills, erythema or drainage from the incisions. She is here today for evaluation and recommendations for further therapy.         Past Medical History:   Steve Murray (20 mg total) by mouth daily. , Disp: 90 tablet, Rfl: 3  valACYclovir HCl 500 MG Oral Tab, Take 1 tablet (500 mg total) by mouth daily. , Disp: 90 tablet, Rfl: 3    No current facility-administered medications on file prior to visit.          Allergies: bloody stools,+ constipation, yellowing of the skin, indigestion, nausea, change in bowel habits, diarrhea, abdominal pain or vomiting blood.      Genitourinary:  The patient denies frequent urination, needing to get up at night to urinate, urinary hesitanc node biopsy Natali Garden) and immediate reconstruction with expander implant Jasmyn Arrant) on October 10, 2019.  Cancer Staging  Malignant neoplasm of overlapping sites of left breast in female, estrogen receptor positive ([facility] [address]  Staging form: Breast, AJCC 8th Edition

## 2019-10-15 NOTE — TELEPHONE ENCOUNTER
----- Message from Adore Barrientos RN sent at 10/15/2019  1:38 PM CDT -----  Regarding: FW: Test Results Question  Contact: [phone]    ----- Message -----  From:  Latonya Lino  Sent: 10/15/2019   1:33 PM CDT  To: Lamar Yang  Subject: Test

## 2019-10-15 NOTE — OPERATIVE REPORT
[facility]    PATIENT'S NAME: EDILMA, [address] PHYSICIAN: Yonatan Reynolds. Myla Cullen MD   OPERATING PHYSICIAN: Yonatan Reynolds.  Myla Cullen MD   PATIENT ACCOUNT#:   [id number]    LOCATION:  [address] [place] [address],[address] #:   [id number]       DATE OF mastectomy instead of the usual procedure of simple mastectomy was necessary. This required surgical excision in order to obtain adequate exposure through this difficult incision.   This also required additional time and effort de to the complex nature of therefore, we proceeded with axillary lymph node dissection. The borders of the axillary dissection were defined as the axillary vein, latissimus tendon, and chest wall. Blunt and sharp dissection were used to evacuate the axillary contents.   These were Attention was then taken toward the right breast where, at the 9 o'clock position, a radial incision had been marked preoperatively. This was incised with a 15 blade knife for the skin. Electrocautery was used for hemostasis.   Using sharp dissection and

## 2019-10-16 ENCOUNTER — OFFICE VISIT (OUTPATIENT)
Dept: SURGERY | Facility: CLINIC | Age: 44
End: 2019-10-16
Payer: COMMERCIAL

## 2019-10-16 VITALS
HEART RATE: 16 BPM | BODY MASS INDEX: 23.16 KG/M2 | OXYGEN SATURATION: 98 % | RESPIRATION RATE: 16 BRPM | DIASTOLIC BLOOD PRESSURE: 76 MMHG | HEIGHT: 65 IN | WEIGHT: 139 LBS | SYSTOLIC BLOOD PRESSURE: 127 MMHG

## 2019-10-16 DIAGNOSIS — C50.912 MALIGNANT NEOPLASM OF LEFT FEMALE BREAST, UNSPECIFIED ESTROGEN RECEPTOR STATUS, UNSPECIFIED SITE OF BREAST (HCC): ICD-10-CM

## 2019-10-16 PROCEDURE — 99024 POSTOP FOLLOW-UP VISIT: CPT | Performed by: SURGERY

## 2019-10-16 RX ORDER — CEPHALEXIN 500 MG/1
500 CAPSULE ORAL 4 TIMES DAILY
Qty: 40 CAPSULE | Refills: 1 | Status: SHIPPED | OUTPATIENT
Start: 2019-10-16 | End: 2019-11-01 | Stop reason: ALTCHOICE

## 2019-10-16 RX ORDER — HYDROCODONE BITARTRATE AND ACETAMINOPHEN 5; 325 MG/1; MG/1
1-2 TABLET ORAL EVERY 6 HOURS PRN
Qty: 40 TABLET | Refills: 0 | Status: SHIPPED | OUTPATIENT
Start: 2019-10-16 | End: 2020-05-27 | Stop reason: ALTCHOICE

## 2019-10-18 ENCOUNTER — TELEPHONE (OUTPATIENT)
Dept: HEMATOLOGY/ONCOLOGY | Facility: HOSPITAL | Age: 44
End: 2019-10-18

## 2019-10-18 ENCOUNTER — OFFICE VISIT (OUTPATIENT)
Dept: SURGERY | Facility: CLINIC | Age: 44
End: 2019-10-18
Payer: COMMERCIAL

## 2019-10-18 DIAGNOSIS — Z90.13 ABSENCE OF BREAST, BILATERAL: Primary | ICD-10-CM

## 2019-10-18 PROCEDURE — 99024 POSTOP FOLLOW-UP VISIT: CPT | Performed by: PHYSICIAN ASSISTANT

## 2019-10-18 NOTE — PROGRESS NOTES
This is a 79-year-old female that is 8 days status post her bilateral nipple sparing mastectomies and left axillary dissection by Dr. Escobar Crowley with immediate reconstruction with tissue expanders, prepectoral placement with ADM, 60 cc Intra-Op air-filled by D as her incisions remain well-healed we will consider air to saline exchange.

## 2019-10-18 NOTE — TELEPHONE ENCOUNTER
Call received from patient with questions and concerns realated to radiation and meeting with Radiation Oncology. Shared with patient that the first step would be to meet with Dr. Adri Ugarte to discuss recommendations for adjuvant treatment.   Following that cons

## 2019-10-23 ENCOUNTER — TELEPHONE (OUTPATIENT)
Dept: HEMATOLOGY/ONCOLOGY | Facility: HOSPITAL | Age: 44
End: 2019-10-23

## 2019-10-23 NOTE — TELEPHONE ENCOUNTER
Phoned patient to address her My Chart message concerning Oncotype DX results. Shared with patient that as these results are reported by an outside lab, they are not visible in her [address]   Shared with patient that results have been provided to Dr. Cris prieto

## 2019-10-25 ENCOUNTER — OFFICE VISIT (OUTPATIENT)
Dept: HEMATOLOGY/ONCOLOGY | Facility: HOSPITAL | Age: 44
End: 2019-10-25
Attending: INTERNAL MEDICINE
Payer: COMMERCIAL

## 2019-10-25 VITALS
OXYGEN SATURATION: 98 % | TEMPERATURE: 98 F | RESPIRATION RATE: 16 BRPM | BODY MASS INDEX: 23.32 KG/M2 | HEIGHT: 65 IN | HEART RATE: 100 BPM | WEIGHT: 140 LBS | DIASTOLIC BLOOD PRESSURE: 64 MMHG | SYSTOLIC BLOOD PRESSURE: 107 MMHG

## 2019-10-25 DIAGNOSIS — C50.812 MALIGNANT NEOPLASM OF OVERLAPPING SITES OF LEFT BREAST IN FEMALE, ESTROGEN RECEPTOR POSITIVE (HCC): Primary | ICD-10-CM

## 2019-10-25 DIAGNOSIS — Z17.0 MALIGNANT NEOPLASM OF OVERLAPPING SITES OF LEFT BREAST IN FEMALE, ESTROGEN RECEPTOR POSITIVE (HCC): Primary | ICD-10-CM

## 2019-10-25 PROCEDURE — 99205 OFFICE O/P NEW HI 60 MIN: CPT | Performed by: INTERNAL MEDICINE

## 2019-10-25 RX ORDER — VENLAFAXINE HYDROCHLORIDE 37.5 MG/1
37.5 CAPSULE, EXTENDED RELEASE ORAL DAILY
Qty: 30 CAPSULE | Refills: 3 | Status: SHIPPED | OUTPATIENT
Start: 2019-10-25 | End: 2020-02-27

## 2019-10-25 RX ORDER — TAMOXIFEN CITRATE 20 MG/1
20 TABLET ORAL DAILY
Qty: 90 TABLET | Refills: 3 | Status: SHIPPED | OUTPATIENT
Start: 2019-11-01 | End: 2020-02-27

## 2019-10-25 NOTE — PROGRESS NOTES
HPI       Hipolito Petersen is a 38year old female who is here today due to new diagnosis of Malignant neoplasm of overlapping sites of left breast in female, estrogen receptor positive (hcc)  (primary encounter diagnosis)       Method of detection:  imagi will not be possible. She is here to discuss further adjuvant therapy. Her pathology was already submitted for Oncotype DX. The patient had not fully reviewed her final pathology prior to this visit.     Risk factors for breast cancer:  premenopausal times daily. , Disp: 30 capsule, Rfl: 1  ondansetron 4 MG Oral Tablet Dispersible, Take 1 tablet (4 mg total) by mouth every 4 (four) hours as needed for Nausea., Disp: 12 tablet, Rfl: 0  FLUoxetine HCl 20 MG Oral Tab, Take 1 tablet (20 mg total) by mouth d on file        Inability: Not on file      Transportation needs:        Medical: Not on file        Non-medical: Not on file    Tobacco Use      Smoking status: Never Smoker      Smokeless tobacco: Never Used    Substance and Sexual Activity      Alcohol u Neck: No JVD. No palpable lymphadenopathy. Neck is supple. Chest: Clear to auscultation. B mastectomies   Heart: Regular rate and rhythm. Abdomen: Soft, non tender with good bowel sounds. Extremities: No edema. Neurological: Grossly intact.    Lymph Discussed with the patient and her  the utility of the Oncotype DX in terms of determining the patient's risk of recurrence, and how the data has been validated for up to 1-3 positive lymph nodes.   Her Oncotype DX score was 12 which is consistent wi additionally would provide her benefit for hot flashes which would be related to treatment.   The patient states that she has been off the fluoxetine for weeks at times when she is troubled she is forgotten to bring it and she should not have any issues wit

## 2019-10-25 NOTE — PATIENT INSTRUCTIONS
Treating Breast Cancer: Adjuvant Therapy     Talk with your healthcare team about your options. If you have breast cancer, you will have many treatment choices.  Before you decide which is best for you, weigh all of your choices. Be sure to discuss them Hormone Therapy for Breast Cancer  Estrogen is one of the female hormones. Sometimes breast cancer cells grow and multiply when exposed to estrogen.  A hormone receptor test is done to measure the amount of certain proteins (called hormone receptors) in a p · LHRH and GnRH agonists. These medicines stop the body from making estrogen and other hormones that are similar to estrogens. These hormones can also cause breast cancer cells to grow. The medicines may be injected into a muscle or just under the skin.  Or · Talk with your doctor about your symptoms. He or she may prescribe medicines that can help you feel better and reduce problems. · Avoid hot tubs, saunas, and hot showers if you notice these increase your symptoms.   · Don't have spicy food, alcohol, and TAMOXIFEN (ta MOX i fen) blocks the effects of estrogen. It is commonly used to treat breast cancer. It is also used to decrease the chance of breast cancer coming back in women who have received treatment for the disease.  It may also help prevent breast c Side effects that usually do not require medical attention (report to your doctor or health care professional if they continue or are bothersome):  · fatigue  · hair loss, although uncommon and is usually mild  · headache  · hot flashes  · nausea, vomiting Visit your doctor or health care professional for regular checks on your progress. You will need regular pelvic exams, breast exams, and mammograms.  If you are taking this medicine to reduce your risk of getting breast cancer, you should know that this med

## 2019-10-29 ENCOUNTER — TELEPHONE (OUTPATIENT)
Dept: HEMATOLOGY/ONCOLOGY | Facility: HOSPITAL | Age: 44
End: 2019-10-29

## 2019-10-29 ENCOUNTER — PATIENT MESSAGE (OUTPATIENT)
Dept: HEMATOLOGY/ONCOLOGY | Facility: HOSPITAL | Age: 44
End: 2019-10-29

## 2019-10-29 ENCOUNTER — TELEPHONE (OUTPATIENT)
Dept: SURGERY | Facility: CLINIC | Age: 44
End: 2019-10-29

## 2019-10-29 NOTE — TELEPHONE ENCOUNTER
Regarding: RE: Test Results Question  ----- Message from Kedar Crouch RN sent at 10/29/2019  1:24 PM CDT -----  I spoke at length with [facility]. I answered questions to the best of my ability. I have encouraged her to STAY OFF THE INTERNET!   I wanted you

## 2019-10-29 NOTE — TELEPHONE ENCOUNTER
Returned patient's call to Breast RN Navigator. Patient shares her feelings of anxiety and un- certainty related to her surgical pathology report. Patient shares that she \"wishes I never had a copy of my pathology report\".     Emotional support provided Reinforced to patient that her surgical pathology was discussed in our [facility] and no recommendations for further surgical intervention was discussed.     Patient then expressed concerns related to her feelings regarding her recent consultation with

## 2019-10-29 NOTE — TELEPHONE ENCOUNTER
Spoke with the patient at length regarding multiple questions related to her pathology and ongoing treatment plan. All questions were answered related to her pathology report including management of the positive margin and JUSTIN on the Lymph node with RT.  On

## 2019-11-01 ENCOUNTER — OFFICE VISIT (OUTPATIENT)
Dept: SURGERY | Facility: CLINIC | Age: 44
End: 2019-11-01
Payer: COMMERCIAL

## 2019-11-01 ENCOUNTER — OFFICE VISIT (OUTPATIENT)
Dept: RADIATION ONCOLOGY | Facility: HOSPITAL | Age: 44
End: 2019-11-01
Attending: RADIOLOGY
Payer: COMMERCIAL

## 2019-11-01 VITALS
SYSTOLIC BLOOD PRESSURE: 116 MMHG | TEMPERATURE: 98 F | WEIGHT: 142.38 LBS | BODY MASS INDEX: 24 KG/M2 | HEART RATE: 75 BPM | OXYGEN SATURATION: 98 % | RESPIRATION RATE: 16 BRPM | DIASTOLIC BLOOD PRESSURE: 68 MMHG

## 2019-11-01 VITALS — TEMPERATURE: 96 F

## 2019-11-01 DIAGNOSIS — Z90.13 ABSENCE OF BREAST, BILATERAL: Primary | ICD-10-CM

## 2019-11-01 PROCEDURE — 99024 POSTOP FOLLOW-UP VISIT: CPT | Performed by: SURGERY

## 2019-11-01 PROCEDURE — 99212 OFFICE O/P EST SF 10 MIN: CPT

## 2019-11-01 NOTE — PROGRESS NOTES
Pt here for post op 10/10/19 bilat breast.  States she is scheduled to start Tamoxifen and Valacyclovir today. C/O bilat discomfort; Left > Right.

## 2019-11-01 NOTE — PROGRESS NOTES
Nursing Consultation Note  Patient: Abena Gimenez  YOB: 1975  Age: 38year old  Radiation Oncologist: Dr. Rohan Martin  Referring Physician: Kenn Turcios  Diagnosis:No diagnosis found.   Consult Date: 11/1/2019      Chemotherapy: No  Labs: R hours as needed for Pain., Disp: 40 tablet, Rfl: 0        Preferred Pharmacy:    [facility], [address] [facility] [phone], [address] [place] [address]  Phone: [phone] Fax: [phone]    [facility] 10/10/2019    Performed by Jazmine Alvarez MD at [facility] MAIN OR   • MASTECTOMY,SIMPLE Bilateral 10/10/19    Bilateral nipple vs skin sparing mastectomy with left SLNB possible left axillary lymph node dissection Randy Small) and immediate reconstruction w tiss Concern: Not Asked    Social History Narrative      Not on file      ECOG:  Grade 0 - Fully active, able to carry on all predisease activities without restrictions. Education:  Yes    Are ADL's met? Yes  Does patient feel safe in their environment? noted. Denies any pain at this time. States at times she has tingling pain to both breast post surgery, but does not need any pain meds. She did have implants filled by Dr. Magdy Williamson today and believes she still has about 5 more appointments.  I explained to

## 2019-11-01 NOTE — CONSULTS
RADIATION ONCOLOGY NOTE    DATE OF VISIT: 11/1/2019    DIAGNOSIS :  Stage IA (pT1c, pN1a, cM0, G2, ER+, NJ+, HER2-), also with DCIS, s/p mastectomy, for consideration of adjuvant XRT to left chest wall and draining LN.     Yoni danielson,    Per our discus · Tumor shows focal extension to inked anterior inferior margin. · Ductal carcinoma in situ, intermediate nuclear grade, cribriform and micropapillary type. · DCIS on a distance of approximately<1 mm from the anterior inferior margin.   · Background nikhil Tamoxifen Citrate 20 MG Oral Tab, Take 1 tablet (20 mg total) by mouth daily. , Disp: 90 tablet, Rfl: 3  valACYclovir HCl 500 MG Oral Tab, Take 1 tablet (500 mg total) by mouth daily. , Disp: 90 tablet, Rfl: 3  Venlafaxine HCl ER 37.5 MG Oral Capsule SR 24 H Wt Readings from Last 6 Encounters:  11/01/19 : 64.6 kg (142 lb 6.4 oz)  10/25/19 : 63.5 kg (140 lb)  10/16/19 : 63 kg (139 lb)  10/10/19 : 63 kg (139 lb)  10/02/19 : 65 kg (143 lb 3.2 oz)  09/24/19 : 63.5 kg (140 lb)       PHYSICAL EXAM  Blood pressure 11 Thank you for allowing me to take care of your patient. Please do not hesitate to contact me directly. Delia Cyr MD, [address] [email or web address]. Yariel Prajapati  [phone]    11/1/2019    Show images for US BREAST UNI RIGHT LIMITED<4 QUADRANTS(CP Ultrasound-guided biopsy is recommended. There are bilateral breast cysts. Recommend annual right  mammography. BI-RADS CATEGORY 5:  HIGHLY SUGGESTIVE OF MALIGNANCY--APPROPRIATE ACTION SHOULD BE TAKEN  . ANY FURTHER EVALUATION SHOULD BE BASED ON CLINICAL A to the nodular density. A small incision was made and a 12-gauge needle was introduced. Multiple  samples were obtained of the nodule left breast 6:00 position 4 cm from the nipple under ultrasound  guidance with the vacuum-assisted device.  [facility] of the left breast in the 6 o'clock position about 4 cm from the nipple. Hourglass clip was placed and was felt to have migrated 1.5 cm anterior and 1.8 cm lateral to the biopsy cavity.   Pathology showed invasive ductal   carcinom There is extensive background enhancement limiting evaluation of the breast parenchyma. The MRI was performed past the requested 7-15 day window in the cycle which could be contributing to these findings.   On series 75208 image 84 there is a   circumscrib RECOMMENDATIONS:   SURGICAL CONSULTATION. PLEASE NOTE: A NORMAL MRI DOES NOT EXCLUDE THE POSSIBILITY OF BREAST CANCER. A CLINICALLY SUSPICIOUS PALPABLE LUMP SHOULD BE BIOPSIED.        For , this case was reviewed by a · DCIS on a distance of approximately<1 mm from the anterior inferior margin. · Background breast tissue with fibrocystic changes including adenosis, ductal hyperplasia, apocrine metaplasia with microcalcifications.   · Preliminary pathologic staging pT1c,

## 2019-11-01 NOTE — PROGRESS NOTES
Juan David Roy is a 38year old female who presents today for a follow-up. She denies fever and chills. She denies nausea, vomiting, diarrhea or constipation.        Physical Examination:   11/01/19  1001   Temp: (!) 96 °F (35.6 °C)   TempSrc: Tympanic

## 2019-11-04 ENCOUNTER — TELEPHONE (OUTPATIENT)
Dept: HEMATOLOGY/ONCOLOGY | Facility: HOSPITAL | Age: 44
End: 2019-11-04

## 2019-11-04 NOTE — TELEPHONE ENCOUNTER
----- Message from Graig Seip, Baylee Guthrie sent at 11/1/2019  2:43 PM CDT -----  Regarding: Schedule Appt  Per Ajit Carbone RN, patient would like to transfer her med onc care from Dr. Ian Sifuentes to Dr. Tiffanie Ordonez.   Please call patient Monday 11/4 and schedule appointment wit

## 2019-11-05 ENCOUNTER — OFFICE VISIT (OUTPATIENT)
Dept: PHYSICAL THERAPY | Facility: HOSPITAL | Age: 44
End: 2019-11-05
Attending: SURGERY
Payer: COMMERCIAL

## 2019-11-05 DIAGNOSIS — C50.912 MALIGNANT NEOPLASM OF LEFT FEMALE BREAST, UNSPECIFIED ESTROGEN RECEPTOR STATUS, UNSPECIFIED SITE OF BREAST (HCC): ICD-10-CM

## 2019-11-05 PROCEDURE — 97161 PT EVAL LOW COMPLEX 20 MIN: CPT | Performed by: PHYSICAL THERAPIST

## 2019-11-05 PROCEDURE — 97140 MANUAL THERAPY 1/> REGIONS: CPT | Performed by: PHYSICAL THERAPIST

## 2019-11-05 PROCEDURE — 97110 THERAPEUTIC EXERCISES: CPT | Performed by: PHYSICAL THERAPIST

## 2019-11-05 NOTE — PROGRESS NOTES
UE LYMPHEDEMA EVALUATION:   Referring Physician: Dr. Odilon Aden  Malignant neoplasm of left female breast, unspecified estrogen receptor status, unspecified site of breast [facility]) (E87.184)    Date of onset: 10/10/19 Date of Service: 11/5/2019     PATIENT SUMMA Numbness B breast incisions and left axilla    AROM/Strength:   - AROM shoulders:    Flex: R 136, L 96   Abd: R 125, L 80   ER/IR: WFL  - strength not tested due to recent surgery and limited ROM      Posture: guarded movements of UEs    Palpation: tender facilitate swelling reduction and to be independent at self management once discharged  3) Increase B shoulder AROM flex and abd to 160 degrees to improve ease of dressing/bathing/and reaching overhead  4) Increase UE strength to at least 4+/5 to improve e

## 2019-11-07 ENCOUNTER — OFFICE VISIT (OUTPATIENT)
Dept: PHYSICAL THERAPY | Facility: HOSPITAL | Age: 44
End: 2019-11-07
Attending: SURGERY
Payer: COMMERCIAL

## 2019-11-07 PROCEDURE — 97110 THERAPEUTIC EXERCISES: CPT

## 2019-11-07 PROCEDURE — 97140 MANUAL THERAPY 1/> REGIONS: CPT

## 2019-11-07 NOTE — PROGRESS NOTES
Referring Physician: Dr. Larsen Deawalter  Malignant neoplasm of left female breast, unspecified estrogen receptor status, unspecified site of breast [facility]) (J48.046)     Date of onset: 10/10/19 Date of Service: 11/5/2019       Fall Risk: NONE          Physical Thera Response:   Certification From: 88/6/8331  To:2/3/2020   Date 11/5/2019 11/7/2019   Visit # 1/10  2/10   Manual Therapy (25 minutes)  STM: provided STM to bilateral axilla, pect, and medial arms.  Additional time spent over L axilla cording - several cavit independent at self management once discharged  3) Increase B shoulder AROM flex and abd to 160 degrees to improve ease of dressing/bathing/and reaching overhead  4) Increase UE strength to at least 4+/5 to improve ease of lifting and performing household

## 2019-11-07 NOTE — PATIENT INSTRUCTIONS
DO THIS EXERCISE ON YOUR LEFT ARM ESPECIALLY, YOU CAN ALSO DO THIS ON YOUR RIGHT ARM AS WELL. 5-10 REPS  5-10 SECOND HOLD  2 TIMES A DAY  1. Lie on your back, place Right hand in Left armpit.    Traction the skin downward (toward your hip) as you raise you

## 2019-11-08 ENCOUNTER — OFFICE VISIT (OUTPATIENT)
Dept: SURGERY | Facility: CLINIC | Age: 44
End: 2019-11-08
Payer: COMMERCIAL

## 2019-11-08 VITALS — TEMPERATURE: 98 F

## 2019-11-08 DIAGNOSIS — Z90.13 ABSENCE OF BREAST, BILATERAL: Primary | ICD-10-CM

## 2019-11-08 PROCEDURE — 99024 POSTOP FOLLOW-UP VISIT: CPT | Performed by: SURGERY

## 2019-11-08 NOTE — PROGRESS NOTES
Maximo Romero is a 38year old female who presents today for a follow-up. She denies fever and chills. She denies nausea, vomiting, diarrhea or constipation.        Physical Examination:   11/08/19  1339   Temp: 97.8 °F (36.6 °C)   TempSrc: Tympanic

## 2019-11-08 NOTE — PROGRESS NOTES
Pt here for post op 10-10-19; Immediate bilateral breast reconstruction with tissue expander and acellular dermal matrix; no complaints at this time.

## 2019-11-12 ENCOUNTER — OFFICE VISIT (OUTPATIENT)
Dept: PHYSICAL THERAPY | Facility: HOSPITAL | Age: 44
End: 2019-11-12
Attending: SURGERY
Payer: COMMERCIAL

## 2019-11-12 PROCEDURE — 97140 MANUAL THERAPY 1/> REGIONS: CPT

## 2019-11-12 PROCEDURE — 97110 THERAPEUTIC EXERCISES: CPT

## 2019-11-12 NOTE — PROGRESS NOTES
Referring Physician: Dr. King Vallejo  Malignant neoplasm of left female breast, unspecified estrogen receptor status, unspecified site of breast [facility]) (M62.585)     Date of onset: 10/10/19 Date of Service: 11/5/2019       Fall Risk: NONE          Physical Thera until patient w/ improved ROM   1+/10 LN removed L    Today’s Treatment and Response:   Certification From: 72/4/7827  To:2/3/2020   Date 11/5/2019 11/7/2019 11/12/2019   Visit # 1/10  2/10 3/10   Manual Therapy (25 minutes)  STM: provided STM to hailey utilized            Assessment: 2 audible and palpable cavitations noted during STM with stretching. Immediate relief felt in upper arm and armpit. Encouraged pt to continue with stretching to maintain gains seen in therapy.       Goals:   Goals (discusse

## 2019-11-14 ENCOUNTER — APPOINTMENT (OUTPATIENT)
Dept: PHYSICAL THERAPY | Facility: HOSPITAL | Age: 44
End: 2019-11-14
Attending: SURGERY
Payer: COMMERCIAL

## 2019-11-15 ENCOUNTER — OFFICE VISIT (OUTPATIENT)
Dept: PHYSICAL THERAPY | Facility: HOSPITAL | Age: 44
End: 2019-11-15
Attending: SURGERY
Payer: COMMERCIAL

## 2019-11-15 ENCOUNTER — OFFICE VISIT (OUTPATIENT)
Dept: SURGERY | Facility: CLINIC | Age: 44
End: 2019-11-15
Payer: COMMERCIAL

## 2019-11-15 DIAGNOSIS — Z90.13 ABSENCE OF BREAST, BILATERAL: Primary | ICD-10-CM

## 2019-11-15 PROCEDURE — 97110 THERAPEUTIC EXERCISES: CPT

## 2019-11-15 PROCEDURE — 97140 MANUAL THERAPY 1/> REGIONS: CPT

## 2019-11-15 PROCEDURE — 99024 POSTOP FOLLOW-UP VISIT: CPT | Performed by: SURGERY

## 2019-11-15 NOTE — PROGRESS NOTES
Referring Physician: Dr. Antonio Esposito  Malignant neoplasm of left female breast, unspecified estrogen receptor status, unspecified site of breast [facility]) (A94.867)     Date of onset: 10/10/19 Date of Service: 11/5/2019       Fall Risk: NONE          Physical Thera exercises)  Stemmer's Sign: negative   Arm volume Measurements: deferred until patient w/ improved ROM   1+/10 LN removed L    Today’s Treatment and Response:   Certification From: 72/3/6863  To:2/3/2020   Date 11/5/2019  11/7/2019 11/12/2019 11/15/2019 pin and stretch to improve tissue mobility. -hands clasped OH flexion x 10 There Ex (15 minutes)  PROM of L shoulder in conjunction with STM, pin and stretch to improve tissue mobility.   -hands clasped OH flexion x 10  -hands clasped behind head, trunk si Re-education, Orthotic Management and Training        Charges:mm2, Ex1   Total Timed Treatment: 45 min  Total Treatment Time: 45 min

## 2019-11-19 ENCOUNTER — OFFICE VISIT (OUTPATIENT)
Dept: PHYSICAL THERAPY | Facility: HOSPITAL | Age: 44
End: 2019-11-19
Attending: SURGERY
Payer: COMMERCIAL

## 2019-11-19 PROCEDURE — 97140 MANUAL THERAPY 1/> REGIONS: CPT

## 2019-11-19 PROCEDURE — 97110 THERAPEUTIC EXERCISES: CPT

## 2019-11-19 NOTE — PROGRESS NOTES
Referring Physician: Dr. Britany Chen  Malignant neoplasm of left female breast, unspecified estrogen receptor status, unspecified site of breast [facility]) (B41.551)     Date of onset: 10/10/19 Date of Service: 11/5/2019       Fall Risk: NONE          Physical Thera swelling noted L axilla, trunk, and upper arm  - patient reporting she has not been doing ROM exercises, has not been moving her arms much (pt did not know she needed to do exercises)  Stemmer's Sign: negative   Arm volume Measurements: deferred until dirk x 10  - AROM flex, abd, horiz abd/add x10  - pulleys flex and abd x 5 minutes        There Ex ( 15 minutes): PROM of L shoulder in conjunction with STM, pin and stretch to improve tissue mobility.   Review of the following exercises:  - cane flex/abd x 10 the next couple of weeks. Goals:   Goals (discussed and planned with patient involvement):       To be met in 10 visits:  1)  Assess arm volume for baseline measurements  2) Pt to be independent with self MLD, ROM exercises, and donning/doffing compress

## 2019-11-21 ENCOUNTER — APPOINTMENT (OUTPATIENT)
Dept: PHYSICAL THERAPY | Facility: HOSPITAL | Age: 44
End: 2019-11-21
Attending: SURGERY
Payer: COMMERCIAL

## 2019-11-22 ENCOUNTER — APPOINTMENT (OUTPATIENT)
Dept: RADIATION ONCOLOGY | Facility: HOSPITAL | Age: 44
End: 2019-11-22
Attending: RADIOLOGY
Payer: COMMERCIAL

## 2019-11-22 ENCOUNTER — APPOINTMENT (OUTPATIENT)
Dept: HEMATOLOGY/ONCOLOGY | Facility: HOSPITAL | Age: 44
End: 2019-11-22
Attending: INTERNAL MEDICINE
Payer: COMMERCIAL

## 2019-11-22 PROCEDURE — 77290 THER RAD SIMULAJ FIELD CPLX: CPT | Performed by: RADIOLOGY

## 2019-11-22 PROCEDURE — 77334 RADIATION TREATMENT AID(S): CPT | Performed by: RADIOLOGY

## 2019-11-25 ENCOUNTER — OFFICE VISIT (OUTPATIENT)
Dept: PHYSICAL THERAPY | Facility: HOSPITAL | Age: 44
End: 2019-11-25
Attending: SURGERY
Payer: COMMERCIAL

## 2019-11-25 ENCOUNTER — OFFICE VISIT (OUTPATIENT)
Dept: HEMATOLOGY/ONCOLOGY | Facility: HOSPITAL | Age: 44
End: 2019-11-25
Attending: INTERNAL MEDICINE
Payer: COMMERCIAL

## 2019-11-25 VITALS
TEMPERATURE: 98 F | OXYGEN SATURATION: 99 % | DIASTOLIC BLOOD PRESSURE: 63 MMHG | RESPIRATION RATE: 16 BRPM | HEART RATE: 82 BPM | SYSTOLIC BLOOD PRESSURE: 107 MMHG | WEIGHT: 139 LBS | BODY MASS INDEX: 23.16 KG/M2 | HEIGHT: 65 IN

## 2019-11-25 DIAGNOSIS — D05.01 NEOPLASM OF RIGHT BREAST, PRIMARY TUMOR STAGING CATEGORY TIS: LOBULAR CARCINOMA IN SITU (LCIS): ICD-10-CM

## 2019-11-25 DIAGNOSIS — C50.812 MALIGNANT NEOPLASM OF OVERLAPPING SITES OF LEFT BREAST IN FEMALE, ESTROGEN RECEPTOR POSITIVE (HCC): Primary | ICD-10-CM

## 2019-11-25 DIAGNOSIS — Z17.0 MALIGNANT NEOPLASM OF OVERLAPPING SITES OF LEFT BREAST IN FEMALE, ESTROGEN RECEPTOR POSITIVE (HCC): Primary | ICD-10-CM

## 2019-11-25 DIAGNOSIS — Z90.13 ABSENCE OF BREAST, BILATERAL: ICD-10-CM

## 2019-11-25 PROCEDURE — 77399 UNLISTED PX MED RADJ PHYSICS: CPT | Performed by: RADIOLOGY

## 2019-11-25 PROCEDURE — 77300 RADIATION THERAPY DOSE PLAN: CPT | Performed by: RADIOLOGY

## 2019-11-25 PROCEDURE — 97140 MANUAL THERAPY 1/> REGIONS: CPT

## 2019-11-25 PROCEDURE — 97110 THERAPEUTIC EXERCISES: CPT

## 2019-11-25 PROCEDURE — 99214 OFFICE O/P EST MOD 30 MIN: CPT | Performed by: INTERNAL MEDICINE

## 2019-11-25 PROCEDURE — 77295 3-D RADIOTHERAPY PLAN: CPT | Performed by: RADIOLOGY

## 2019-11-25 PROCEDURE — 77334 RADIATION TREATMENT AID(S): CPT | Performed by: RADIOLOGY

## 2019-11-25 NOTE — PROGRESS NOTES
Cancer Center Progress Note    Patient Name: Toy Montez   YOB: 1975   Medical Record Number: [id number]   Attending Physician: Shane Scheuermann, M.D.      Chief Complaint:  Breast Cancer    Oncology History:  Patient initially presented with ab Depression    • HERPES SIMPLEX    • IBS (irritable bowel syndrome)    • Malignant neoplasm of overlapping sites of left breast in female, estrogen receptor positive ([facility] [address] 9/20/2019   • Visual impairment     wears glasses       Past Surgical History:  Past (six) hours as needed for Pain., Disp: 40 tablet, Rfl: 0    Allergies:    Latex                   ITCHING     Review of Systems:  All other systems reviewed and negative x12 except as listed above    Vital Signs:  /63 (BP Location: Right arm, Patient bilateral mastectomies, left ALND. Focal extension to the inked anterior inferior margin and LN with focal extension.   - Agree with PMRT given these findings  - Tamoxifen 5-10 years, change to AI after menopause.  Continue yearly gyn exam on berry  - Continu

## 2019-11-25 NOTE — PROGRESS NOTES
Referring Physician: Dr. Donny Bustos  Malignant neoplasm of left female breast, unspecified estrogen receptor status, unspecified site of breast [facility]) (W04.069)     Date of onset: 10/10/19 Date of Service: 11/5/2019       Fall Risk: NONE          Physical Thera tightness B pects  - cording left axilla, extends into forearm   - swelling noted L axilla, trunk, and upper arm  - patient reporting she has not been doing ROM exercises, has not been moving her arms much (pt did not know she needed to do exercises)  Stem to help tissue around expanders move. Manual therapy (30 minute)  STM provided to B axilla, pect, breasts and L upper arm. More time spent on L side where pt has most tightness overall.   Scar tissue massage L axilla and L trunk area of drain insertion sc minutes):   Management/Education: Explained Lymphedema diagnosis; informed patient of lymphedema precautions and risk reduction practices, and importance of skin care.  Discussed and demonstrated bandaging and compression options including various vendors t

## 2019-12-01 ENCOUNTER — APPOINTMENT (OUTPATIENT)
Dept: RADIATION ONCOLOGY | Facility: HOSPITAL | Age: 44
End: 2019-12-01
Attending: RADIOLOGY
Payer: COMMERCIAL

## 2019-12-02 ENCOUNTER — TELEPHONE (OUTPATIENT)
Dept: PHYSICAL THERAPY | Facility: HOSPITAL | Age: 44
End: 2019-12-02

## 2019-12-03 ENCOUNTER — APPOINTMENT (OUTPATIENT)
Dept: PHYSICAL THERAPY | Facility: HOSPITAL | Age: 44
End: 2019-12-03
Attending: SURGERY
Payer: COMMERCIAL

## 2019-12-06 ENCOUNTER — APPOINTMENT (OUTPATIENT)
Dept: RADIATION ONCOLOGY | Facility: HOSPITAL | Age: 44
End: 2019-12-06
Attending: RADIOLOGY
Payer: COMMERCIAL

## 2019-12-06 PROCEDURE — 77280 THER RAD SIMULAJ FIELD SMPL: CPT | Performed by: RADIOLOGY

## 2019-12-09 ENCOUNTER — APPOINTMENT (OUTPATIENT)
Dept: RADIATION ONCOLOGY | Facility: HOSPITAL | Age: 44
End: 2019-12-09
Attending: RADIOLOGY
Payer: COMMERCIAL

## 2019-12-09 PROCEDURE — 77412 RADIATION TX DELIVERY LVL 3: CPT | Performed by: RADIOLOGY

## 2019-12-09 PROCEDURE — 77387 GUIDANCE FOR RADJ TX DLVR: CPT | Performed by: RADIOLOGY

## 2019-12-09 PROCEDURE — 77332 RADIATION TREATMENT AID(S): CPT | Performed by: RADIOLOGY

## 2019-12-10 ENCOUNTER — APPOINTMENT (OUTPATIENT)
Dept: PHYSICAL THERAPY | Facility: HOSPITAL | Age: 44
End: 2019-12-10
Attending: SURGERY
Payer: COMMERCIAL

## 2019-12-10 PROCEDURE — 77412 RADIATION TX DELIVERY LVL 3: CPT | Performed by: RADIOLOGY

## 2019-12-10 PROCEDURE — 77290 THER RAD SIMULAJ FIELD CPLX: CPT | Performed by: RADIOLOGY

## 2019-12-11 PROCEDURE — 77412 RADIATION TX DELIVERY LVL 3: CPT | Performed by: RADIOLOGY

## 2019-12-11 PROCEDURE — 77387 GUIDANCE FOR RADJ TX DLVR: CPT | Performed by: RADIOLOGY

## 2019-12-11 PROCEDURE — 77331 SPECIAL RADIATION DOSIMETRY: CPT | Performed by: RADIOLOGY

## 2019-12-12 ENCOUNTER — TELEPHONE (OUTPATIENT)
Dept: HEMATOLOGY/ONCOLOGY | Facility: HOSPITAL | Age: 44
End: 2019-12-12

## 2019-12-12 ENCOUNTER — OFFICE VISIT (OUTPATIENT)
Dept: RADIATION ONCOLOGY | Facility: HOSPITAL | Age: 44
End: 2019-12-12
Attending: RADIOLOGY
Payer: COMMERCIAL

## 2019-12-12 DIAGNOSIS — C50.812 MALIGNANT NEOPLASM OF OVERLAPPING SITES OF LEFT BREAST IN FEMALE, ESTROGEN RECEPTOR POSITIVE (HCC): Primary | ICD-10-CM

## 2019-12-12 DIAGNOSIS — Z17.0 MALIGNANT NEOPLASM OF OVERLAPPING SITES OF LEFT BREAST IN FEMALE, ESTROGEN RECEPTOR POSITIVE (HCC): Primary | ICD-10-CM

## 2019-12-12 PROCEDURE — 77387 GUIDANCE FOR RADJ TX DLVR: CPT | Performed by: RADIOLOGY

## 2019-12-12 PROCEDURE — 77412 RADIATION TX DELIVERY LVL 3: CPT | Performed by: RADIOLOGY

## 2019-12-12 RX ORDER — MOMETASONE FUROATE 1 MG/G
1 CREAM TOPICAL 2 TIMES DAILY
Qty: 45 G | Refills: 2 | Status: SHIPPED | OUTPATIENT
Start: 2019-12-12 | End: 2020-03-11 | Stop reason: ALTCHOICE

## 2019-12-12 RX ORDER — MOMETASONE FUROATE 1 MG/G
1 CREAM TOPICAL 2 TIMES DAILY
Qty: 45 G | Refills: 2 | Status: SHIPPED | OUTPATIENT
Start: 2019-12-12 | End: 2019-12-12 | Stop reason: CLARIF

## 2019-12-12 NOTE — PROGRESS NOTES
[facility] Radiation Treatment Management Note 1-5    Patient: Thalia Bosworth  Age:  38year old  Visit Diagnosis:    1.  Malignant neoplasm of overlapping sites of left breast in female, estrogen receptor positive ([facility] [address]      Primary

## 2019-12-12 NOTE — TELEPHONE ENCOUNTER
[facility] called to get information on the Mometasone script that was sent in. They were looking to get clarification as to where the cream needs to be put.

## 2019-12-13 PROCEDURE — 77412 RADIATION TX DELIVERY LVL 3: CPT | Performed by: RADIOLOGY

## 2019-12-13 PROCEDURE — 77387 GUIDANCE FOR RADJ TX DLVR: CPT | Performed by: RADIOLOGY

## 2019-12-13 PROCEDURE — 77336 RADIATION PHYSICS CONSULT: CPT | Performed by: RADIOLOGY

## 2019-12-16 ENCOUNTER — TELEPHONE (OUTPATIENT)
Dept: PHYSICAL THERAPY | Facility: HOSPITAL | Age: 44
End: 2019-12-16

## 2019-12-16 ENCOUNTER — OFFICE VISIT (OUTPATIENT)
Dept: RADIATION ONCOLOGY | Facility: HOSPITAL | Age: 44
End: 2019-12-16
Attending: RADIOLOGY
Payer: COMMERCIAL

## 2019-12-16 VITALS — DIASTOLIC BLOOD PRESSURE: 75 MMHG | HEART RATE: 66 BPM | RESPIRATION RATE: 16 BRPM | SYSTOLIC BLOOD PRESSURE: 117 MMHG

## 2019-12-16 DIAGNOSIS — C50.812 MALIGNANT NEOPLASM OF OVERLAPPING SITES OF LEFT BREAST IN FEMALE, ESTROGEN RECEPTOR POSITIVE (HCC): Primary | ICD-10-CM

## 2019-12-16 DIAGNOSIS — Z17.0 MALIGNANT NEOPLASM OF OVERLAPPING SITES OF LEFT BREAST IN FEMALE, ESTROGEN RECEPTOR POSITIVE (HCC): Primary | ICD-10-CM

## 2019-12-16 PROCEDURE — 77412 RADIATION TX DELIVERY LVL 3: CPT | Performed by: RADIOLOGY

## 2019-12-16 PROCEDURE — 77387 GUIDANCE FOR RADJ TX DLVR: CPT | Performed by: RADIOLOGY

## 2019-12-16 NOTE — PROGRESS NOTES
[facility] Radiation Treatment Management Note 6-10    Patient: Perfecto Escalona  Age:  38year old  Visit Diagnosis:    1.  Malignant neoplasm of overlapping sites of left breast in female, estrogen receptor positive ([facility] [address]      Primar

## 2019-12-17 ENCOUNTER — APPOINTMENT (OUTPATIENT)
Dept: PHYSICAL THERAPY | Facility: HOSPITAL | Age: 44
End: 2019-12-17
Attending: SURGERY
Payer: COMMERCIAL

## 2019-12-17 PROCEDURE — 77412 RADIATION TX DELIVERY LVL 3: CPT | Performed by: RADIOLOGY

## 2019-12-17 PROCEDURE — 77387 GUIDANCE FOR RADJ TX DLVR: CPT | Performed by: RADIOLOGY

## 2019-12-18 PROCEDURE — 77412 RADIATION TX DELIVERY LVL 3: CPT | Performed by: RADIOLOGY

## 2019-12-18 PROCEDURE — 77387 GUIDANCE FOR RADJ TX DLVR: CPT | Performed by: RADIOLOGY

## 2019-12-19 PROCEDURE — 77387 GUIDANCE FOR RADJ TX DLVR: CPT | Performed by: RADIOLOGY

## 2019-12-19 PROCEDURE — 77412 RADIATION TX DELIVERY LVL 3: CPT | Performed by: RADIOLOGY

## 2019-12-20 PROCEDURE — 77387 GUIDANCE FOR RADJ TX DLVR: CPT | Performed by: RADIOLOGY

## 2019-12-20 PROCEDURE — 77336 RADIATION PHYSICS CONSULT: CPT | Performed by: RADIOLOGY

## 2019-12-20 PROCEDURE — 77412 RADIATION TX DELIVERY LVL 3: CPT | Performed by: RADIOLOGY

## 2019-12-23 ENCOUNTER — OFFICE VISIT (OUTPATIENT)
Dept: RADIATION ONCOLOGY | Facility: HOSPITAL | Age: 44
End: 2019-12-23
Attending: RADIOLOGY
Payer: COMMERCIAL

## 2019-12-23 VITALS
SYSTOLIC BLOOD PRESSURE: 101 MMHG | HEART RATE: 81 BPM | RESPIRATION RATE: 16 BRPM | DIASTOLIC BLOOD PRESSURE: 66 MMHG | TEMPERATURE: 98 F

## 2019-12-23 DIAGNOSIS — Z17.0 MALIGNANT NEOPLASM OF OVERLAPPING SITES OF LEFT BREAST IN FEMALE, ESTROGEN RECEPTOR POSITIVE (HCC): Primary | ICD-10-CM

## 2019-12-23 DIAGNOSIS — C50.812 MALIGNANT NEOPLASM OF OVERLAPPING SITES OF LEFT BREAST IN FEMALE, ESTROGEN RECEPTOR POSITIVE (HCC): Primary | ICD-10-CM

## 2019-12-23 PROCEDURE — 77412 RADIATION TX DELIVERY LVL 3: CPT | Performed by: RADIOLOGY

## 2019-12-23 PROCEDURE — 77387 GUIDANCE FOR RADJ TX DLVR: CPT | Performed by: RADIOLOGY

## 2019-12-23 NOTE — PROGRESS NOTES
[facility] Radiation Treatment Management Note 11-15    Patient: Dorene Leach  Age:  38year old  Visit Diagnosis:    1.  Malignant neoplasm of overlapping sites of left breast in female, estrogen receptor positive ([facility] [address]      Prima

## 2019-12-24 PROCEDURE — 77412 RADIATION TX DELIVERY LVL 3: CPT | Performed by: RADIOLOGY

## 2019-12-24 PROCEDURE — 77387 GUIDANCE FOR RADJ TX DLVR: CPT | Performed by: RADIOLOGY

## 2019-12-26 PROCEDURE — 77412 RADIATION TX DELIVERY LVL 3: CPT | Performed by: RADIOLOGY

## 2019-12-26 PROCEDURE — 77387 GUIDANCE FOR RADJ TX DLVR: CPT | Performed by: RADIOLOGY

## 2019-12-27 PROCEDURE — 77412 RADIATION TX DELIVERY LVL 3: CPT | Performed by: RADIOLOGY

## 2019-12-27 PROCEDURE — 77387 GUIDANCE FOR RADJ TX DLVR: CPT | Performed by: RADIOLOGY

## 2019-12-30 ENCOUNTER — APPOINTMENT (OUTPATIENT)
Dept: PHYSICAL THERAPY | Facility: HOSPITAL | Age: 44
End: 2019-12-30
Attending: SURGERY
Payer: COMMERCIAL

## 2019-12-30 ENCOUNTER — OFFICE VISIT (OUTPATIENT)
Dept: RADIATION ONCOLOGY | Facility: HOSPITAL | Age: 44
End: 2019-12-30
Attending: RADIOLOGY
Payer: COMMERCIAL

## 2019-12-30 VITALS — RESPIRATION RATE: 16 BRPM | SYSTOLIC BLOOD PRESSURE: 107 MMHG | DIASTOLIC BLOOD PRESSURE: 69 MMHG | HEART RATE: 71 BPM

## 2019-12-30 DIAGNOSIS — C50.812 MALIGNANT NEOPLASM OF OVERLAPPING SITES OF LEFT BREAST IN FEMALE, ESTROGEN RECEPTOR POSITIVE (HCC): Primary | ICD-10-CM

## 2019-12-30 DIAGNOSIS — Z17.0 MALIGNANT NEOPLASM OF OVERLAPPING SITES OF LEFT BREAST IN FEMALE, ESTROGEN RECEPTOR POSITIVE (HCC): Primary | ICD-10-CM

## 2019-12-30 PROCEDURE — 77387 GUIDANCE FOR RADJ TX DLVR: CPT | Performed by: RADIOLOGY

## 2019-12-30 PROCEDURE — 77412 RADIATION TX DELIVERY LVL 3: CPT | Performed by: RADIOLOGY

## 2019-12-30 NOTE — PROGRESS NOTES
[facility] Radiation Treatment Management Note 11-15    Patient: Perfecto Escalona  Age:  38year old  Visit Diagnosis:    1.  Malignant neoplasm of overlapping sites of left breast in female, estrogen receptor positive ([facility] [address]      Prima

## 2020-01-01 ENCOUNTER — APPOINTMENT (OUTPATIENT)
Dept: RADIATION ONCOLOGY | Facility: HOSPITAL | Age: 45
End: 2020-01-01
Attending: RADIOLOGY
Payer: COMMERCIAL

## 2020-01-02 PROCEDURE — 77387 GUIDANCE FOR RADJ TX DLVR: CPT | Performed by: RADIOLOGY

## 2020-01-02 PROCEDURE — 77412 RADIATION TX DELIVERY LVL 3: CPT | Performed by: RADIOLOGY

## 2020-01-03 PROCEDURE — 77412 RADIATION TX DELIVERY LVL 3: CPT | Performed by: RADIOLOGY

## 2020-01-03 PROCEDURE — 77387 GUIDANCE FOR RADJ TX DLVR: CPT | Performed by: RADIOLOGY

## 2020-01-03 PROCEDURE — 77336 RADIATION PHYSICS CONSULT: CPT | Performed by: RADIOLOGY

## 2020-01-06 ENCOUNTER — OFFICE VISIT (OUTPATIENT)
Dept: RADIATION ONCOLOGY | Facility: HOSPITAL | Age: 45
End: 2020-01-06
Attending: RADIOLOGY
Payer: COMMERCIAL

## 2020-01-06 VITALS — RESPIRATION RATE: 16 BRPM | SYSTOLIC BLOOD PRESSURE: 104 MMHG | DIASTOLIC BLOOD PRESSURE: 63 MMHG | HEART RATE: 71 BPM

## 2020-01-06 DIAGNOSIS — C50.812 MALIGNANT NEOPLASM OF OVERLAPPING SITES OF LEFT BREAST IN FEMALE, ESTROGEN RECEPTOR POSITIVE (HCC): Primary | ICD-10-CM

## 2020-01-06 DIAGNOSIS — Z17.0 MALIGNANT NEOPLASM OF OVERLAPPING SITES OF LEFT BREAST IN FEMALE, ESTROGEN RECEPTOR POSITIVE (HCC): Primary | ICD-10-CM

## 2020-01-06 PROCEDURE — 77387 GUIDANCE FOR RADJ TX DLVR: CPT | Performed by: RADIOLOGY

## 2020-01-06 PROCEDURE — 77412 RADIATION TX DELIVERY LVL 3: CPT | Performed by: RADIOLOGY

## 2020-01-06 NOTE — PROGRESS NOTES
[facility] Radiation Treatment Management Note 16-20    Patient: Gabriel Francois  Age:  38year old  Visit Diagnosis:    1.  Malignant neoplasm of overlapping sites of left breast in female, estrogen receptor positive ([facility] [address]      Prima

## 2020-01-07 PROCEDURE — 77412 RADIATION TX DELIVERY LVL 3: CPT | Performed by: RADIOLOGY

## 2020-01-07 PROCEDURE — 77334 RADIATION TREATMENT AID(S): CPT | Performed by: RADIOLOGY

## 2020-01-07 PROCEDURE — 77290 THER RAD SIMULAJ FIELD CPLX: CPT | Performed by: RADIOLOGY

## 2020-01-07 PROCEDURE — 77321 SPECIAL TELETX PORT PLAN: CPT | Performed by: RADIOLOGY

## 2020-01-08 PROCEDURE — 77412 RADIATION TX DELIVERY LVL 3: CPT | Performed by: RADIOLOGY

## 2020-01-08 PROCEDURE — 77387 GUIDANCE FOR RADJ TX DLVR: CPT | Performed by: RADIOLOGY

## 2020-01-09 PROCEDURE — 77387 GUIDANCE FOR RADJ TX DLVR: CPT | Performed by: RADIOLOGY

## 2020-01-09 PROCEDURE — 77412 RADIATION TX DELIVERY LVL 3: CPT | Performed by: RADIOLOGY

## 2020-01-10 PROCEDURE — 77412 RADIATION TX DELIVERY LVL 3: CPT | Performed by: RADIOLOGY

## 2020-01-10 PROCEDURE — 77336 RADIATION PHYSICS CONSULT: CPT | Performed by: RADIOLOGY

## 2020-01-10 PROCEDURE — 77387 GUIDANCE FOR RADJ TX DLVR: CPT | Performed by: RADIOLOGY

## 2020-01-13 ENCOUNTER — OFFICE VISIT (OUTPATIENT)
Dept: RADIATION ONCOLOGY | Facility: HOSPITAL | Age: 45
End: 2020-01-13
Attending: RADIOLOGY
Payer: COMMERCIAL

## 2020-01-13 VITALS — HEART RATE: 75 BPM | SYSTOLIC BLOOD PRESSURE: 101 MMHG | RESPIRATION RATE: 16 BRPM | DIASTOLIC BLOOD PRESSURE: 67 MMHG

## 2020-01-13 DIAGNOSIS — C50.812 MALIGNANT NEOPLASM OF OVERLAPPING SITES OF LEFT BREAST IN FEMALE, ESTROGEN RECEPTOR POSITIVE (HCC): Primary | ICD-10-CM

## 2020-01-13 DIAGNOSIS — Z17.0 MALIGNANT NEOPLASM OF OVERLAPPING SITES OF LEFT BREAST IN FEMALE, ESTROGEN RECEPTOR POSITIVE (HCC): Primary | ICD-10-CM

## 2020-01-13 PROCEDURE — 77387 GUIDANCE FOR RADJ TX DLVR: CPT | Performed by: RADIOLOGY

## 2020-01-13 PROCEDURE — 77412 RADIATION TX DELIVERY LVL 3: CPT | Performed by: RADIOLOGY

## 2020-01-13 NOTE — PROGRESS NOTES
[facility] Radiation Treatment Management Note 21-25    Patient: Yeung Bombard  Age:  38year old  Visit Diagnosis:    1.  Malignant neoplasm of overlapping sites of left breast in female, estrogen receptor positive ([facility] [address]      Prima

## 2020-01-14 PROCEDURE — 77387 GUIDANCE FOR RADJ TX DLVR: CPT | Performed by: RADIOLOGY

## 2020-01-14 PROCEDURE — 77412 RADIATION TX DELIVERY LVL 3: CPT | Performed by: RADIOLOGY

## 2020-01-15 PROCEDURE — 77387 GUIDANCE FOR RADJ TX DLVR: CPT | Performed by: RADIOLOGY

## 2020-01-15 PROCEDURE — 77412 RADIATION TX DELIVERY LVL 3: CPT | Performed by: RADIOLOGY

## 2020-01-16 ENCOUNTER — APPOINTMENT (OUTPATIENT)
Dept: RADIATION ONCOLOGY | Facility: HOSPITAL | Age: 45
End: 2020-01-16
Attending: INTERNAL MEDICINE
Payer: COMMERCIAL

## 2020-01-16 PROCEDURE — 77280 THER RAD SIMULAJ FIELD SMPL: CPT | Performed by: RADIOLOGY

## 2020-01-16 PROCEDURE — 77412 RADIATION TX DELIVERY LVL 3: CPT | Performed by: RADIOLOGY

## 2020-01-16 PROCEDURE — 77332 RADIATION TREATMENT AID(S): CPT | Performed by: RADIOLOGY

## 2020-01-17 PROCEDURE — 77412 RADIATION TX DELIVERY LVL 3: CPT | Performed by: RADIOLOGY

## 2020-01-17 PROCEDURE — 77336 RADIATION PHYSICS CONSULT: CPT | Performed by: RADIOLOGY

## 2020-01-20 ENCOUNTER — PATIENT MESSAGE (OUTPATIENT)
Dept: SURGERY | Facility: CLINIC | Age: 45
End: 2020-01-20

## 2020-01-20 ENCOUNTER — OFFICE VISIT (OUTPATIENT)
Dept: RADIATION ONCOLOGY | Facility: HOSPITAL | Age: 45
End: 2020-01-20
Attending: RADIOLOGY
Payer: COMMERCIAL

## 2020-01-20 VITALS
SYSTOLIC BLOOD PRESSURE: 110 MMHG | HEART RATE: 68 BPM | TEMPERATURE: 97 F | DIASTOLIC BLOOD PRESSURE: 68 MMHG | RESPIRATION RATE: 16 BRPM

## 2020-01-20 DIAGNOSIS — C50.812 MALIGNANT NEOPLASM OF OVERLAPPING SITES OF LEFT BREAST IN FEMALE, ESTROGEN RECEPTOR POSITIVE (HCC): Primary | ICD-10-CM

## 2020-01-20 DIAGNOSIS — Z17.0 MALIGNANT NEOPLASM OF OVERLAPPING SITES OF LEFT BREAST IN FEMALE, ESTROGEN RECEPTOR POSITIVE (HCC): Primary | ICD-10-CM

## 2020-01-20 PROCEDURE — 77412 RADIATION TX DELIVERY LVL 3: CPT | Performed by: RADIOLOGY

## 2020-01-20 NOTE — PROGRESS NOTES
[facility] Radiation Treatment Management Note 26-30    Patient: Gabriel Francois  Age:  38year old  Visit Diagnosis:    1.  Malignant neoplasm of overlapping sites of left breast in female, estrogen receptor positive ([facility] [address]      Prima

## 2020-01-20 NOTE — PATIENT INSTRUCTIONS
Continue to moisturize for the next 3 weeks following radiation. Your skin will remain sun sensitive for months, even years, so apply SPF 27 or greater when out in the sun. Please call with any questions or concerns to RN #:  [phone].     Follow

## 2020-01-21 PROCEDURE — 77412 RADIATION TX DELIVERY LVL 3: CPT | Performed by: RADIOLOGY

## 2020-01-22 ENCOUNTER — TELEPHONE (OUTPATIENT)
Dept: HEMATOLOGY/ONCOLOGY | Facility: HOSPITAL | Age: 45
End: 2020-01-22

## 2020-01-22 PROCEDURE — 77412 RADIATION TX DELIVERY LVL 3: CPT | Performed by: RADIOLOGY

## 2020-01-22 NOTE — PROGRESS NOTES
RADIATION ONCOLOGY COMPLETION SUMMARY NOTE    DIAGNOSIS :  Stage IA (pT1c, pN1a, cM0, G2, ER+, MA+, HER2-), also with DCIS, s/p mastectomy, for consideration of adjuvant XRT to left chest wall and draining LN on 1/22/2020.     Dear colleagues,    As you rec 10X/18X 2 25 / 25 0 50 12/9/2019 1/15/2020 37   L CW 18X/6X 2 25 / 25 0 50 12/9/2019 1/15/2020 37   L CW Bst 6E 2 5 / 5 0 10 1/16/2020 1/22/2020 6   Total:     60 12/9/2019 1/22/2020 44       Treatment Technique:  3D Tangents  En Face Electrons  En Face El

## 2020-01-22 NOTE — TELEPHONE ENCOUNTER
[id number] [place] [address] at [phone] is calling to inform Dr. Johanne Castellon that the [address] that he ordered are not covered by the patient's [facility].

## 2020-01-24 PROCEDURE — 77336 RADIATION PHYSICS CONSULT: CPT | Performed by: RADIOLOGY

## 2020-02-25 ENCOUNTER — TELEPHONE (OUTPATIENT)
Dept: PHYSICAL THERAPY | Facility: HOSPITAL | Age: 45
End: 2020-02-25

## 2020-02-25 ENCOUNTER — APPOINTMENT (OUTPATIENT)
Dept: HEMATOLOGY/ONCOLOGY | Facility: HOSPITAL | Age: 45
End: 2020-02-25
Attending: INTERNAL MEDICINE
Payer: COMMERCIAL

## 2020-02-27 ENCOUNTER — OFFICE VISIT (OUTPATIENT)
Dept: HEMATOLOGY/ONCOLOGY | Facility: HOSPITAL | Age: 45
End: 2020-02-27
Attending: INTERNAL MEDICINE
Payer: COMMERCIAL

## 2020-02-27 VITALS
SYSTOLIC BLOOD PRESSURE: 104 MMHG | HEART RATE: 66 BPM | RESPIRATION RATE: 16 BRPM | HEIGHT: 65 IN | BODY MASS INDEX: 24.06 KG/M2 | OXYGEN SATURATION: 95 % | TEMPERATURE: 98 F | DIASTOLIC BLOOD PRESSURE: 74 MMHG | WEIGHT: 144.38 LBS

## 2020-02-27 DIAGNOSIS — Z90.13 ABSENCE OF BREAST, BILATERAL: ICD-10-CM

## 2020-02-27 DIAGNOSIS — D05.01 NEOPLASM OF RIGHT BREAST, PRIMARY TUMOR STAGING CATEGORY TIS: LOBULAR CARCINOMA IN SITU (LCIS): ICD-10-CM

## 2020-02-27 DIAGNOSIS — Z17.0 MALIGNANT NEOPLASM OF OVERLAPPING SITES OF LEFT BREAST IN FEMALE, ESTROGEN RECEPTOR POSITIVE (HCC): Primary | ICD-10-CM

## 2020-02-27 DIAGNOSIS — C50.812 MALIGNANT NEOPLASM OF OVERLAPPING SITES OF LEFT BREAST IN FEMALE, ESTROGEN RECEPTOR POSITIVE (HCC): Primary | ICD-10-CM

## 2020-02-27 DIAGNOSIS — Z79.810 LONG-TERM CURRENT USE OF TAMOXIFEN: ICD-10-CM

## 2020-02-27 PROCEDURE — 99214 OFFICE O/P EST MOD 30 MIN: CPT | Performed by: INTERNAL MEDICINE

## 2020-02-27 RX ORDER — VENLAFAXINE HYDROCHLORIDE 37.5 MG/1
37.5 CAPSULE, EXTENDED RELEASE ORAL DAILY
Qty: 30 CAPSULE | Refills: 3 | Status: SHIPPED | OUTPATIENT
Start: 2020-02-27 | End: 2020-03-11

## 2020-02-27 RX ORDER — TAMOXIFEN CITRATE 20 MG/1
20 TABLET ORAL DAILY
Qty: 90 TABLET | Refills: 3 | Status: SHIPPED | OUTPATIENT
Start: 2020-02-27 | End: 2021-07-27 | Stop reason: ALTCHOICE

## 2020-02-27 NOTE — PROGRESS NOTES
Cancer Center Progress Note    Patient Name: Lee Bahena   YOB: 1975   Medical Record Number: [id number]   Attending Physician: Facundo Cardona M.D.      Chief Complaint:  Breast Cancer    Oncology History:   August 30, 2019: Mammo: nodularity allergy induced   • Depression    • HERPES SIMPLEX    • IBS (irritable bowel syndrome)    • Malignant neoplasm of overlapping sites of left breast in female, estrogen receptor positive [facility] 9/20/2019   • Visual impairment     wears glasses       Past Surg 1-2 tablets by mouth every 6 (six) hours as needed for Pain., Disp: 40 tablet, Rfl: 0  •  valACYclovir HCl 500 MG Oral Tab, Take 1 tablet (500 mg total) by mouth daily. , Disp: 90 tablet, Rfl: 3    Allergies:    Latex                   ITCHING     Review of score: 12) - Signed by Johan Moreno MD on 10/25/2019  1. Left breast IDC, Stage IA [id number] IDC, ER/AK positive  2. Right breast LCIS  3. Long-term use of tamoxifen  - s/p bilateral mastectomies, left ALND.  Focal extension to the inked anterior inferior

## 2020-02-28 ENCOUNTER — OFFICE VISIT (OUTPATIENT)
Dept: SURGERY | Facility: CLINIC | Age: 45
End: 2020-02-28
Payer: COMMERCIAL

## 2020-02-28 DIAGNOSIS — Z90.13 ABSENCE OF BREAST, BILATERAL: Primary | ICD-10-CM

## 2020-02-28 PROCEDURE — 99212 OFFICE O/P EST SF 10 MIN: CPT | Performed by: SURGERY

## 2020-02-28 NOTE — PROGRESS NOTES
Linnea Elizalde is a 38year old female who presents today for a follow-up. She completed left breast radiation approximately 2 months ago. Physical Examination:  Breasts:  The left breast is noted to have mild hyperpigmentation consistent with radiati

## 2020-03-10 ENCOUNTER — OFFICE VISIT (OUTPATIENT)
Dept: HEMATOLOGY/ONCOLOGY | Facility: HOSPITAL | Age: 45
End: 2020-03-10
Attending: INTERNAL MEDICINE
Payer: COMMERCIAL

## 2020-03-10 DIAGNOSIS — Z08 ENCOUNTER FOR FOLLOW-UP EXAMINATION AFTER COMPLETED TREATMENT FOR MALIGNANT NEOPLASM: Primary | ICD-10-CM

## 2020-03-10 DIAGNOSIS — Z85.3 PERSONAL HISTORY OF BREAST CANCER: ICD-10-CM

## 2020-03-10 DIAGNOSIS — Z71.9 COUNSELING, UNSPECIFIED: ICD-10-CM

## 2020-03-10 PROCEDURE — 99215 OFFICE O/P EST HI 40 MIN: CPT | Performed by: NURSE PRACTITIONER

## 2020-03-11 ENCOUNTER — TELEPHONE (OUTPATIENT)
Dept: HEMATOLOGY/ONCOLOGY | Facility: HOSPITAL | Age: 45
End: 2020-03-11

## 2020-03-11 DIAGNOSIS — Z85.3 PERSONAL HISTORY OF BREAST CANCER: Primary | ICD-10-CM

## 2020-03-11 NOTE — TELEPHONE ENCOUNTER
Left message for melissa in regards to her medication increase. Sent to her [employer]. Asked for a call back if this was incorrect.

## 2020-03-11 NOTE — PROGRESS NOTES
I met with Katie Mahajan for a Survivorship Clinic visit to provide a survivorship care plan (SCP) and information related to post-treatment care. She came to the visit alone.   She has a diagnosis of Stage IA left infiltrating ductal carcinoma and right lobular c specialists: Dr. Gisele Garcia will continue to manage all general health care recommended for age and gender including cancer screening tests. She has a family history of colon cancer and does plan to have colon screening in near future.   She may also cons and child programs, mind-body programs  -Integrative Medicine: Hope Lives Here  -ChooseMyPlate.gov handout-nutrition  -ACS Cancer Screening Guidelines  -ACS -Sexuality and the Woman with Cancer  -Websites: American Cancer Society, Living Beyond Breast Canc

## 2020-03-17 ENCOUNTER — APPOINTMENT (OUTPATIENT)
Dept: PHYSICAL THERAPY | Facility: HOSPITAL | Age: 45
End: 2020-03-17
Attending: SURGERY
Payer: COMMERCIAL

## 2020-04-01 ENCOUNTER — APPOINTMENT (OUTPATIENT)
Dept: PHYSICAL THERAPY | Facility: HOSPITAL | Age: 45
End: 2020-04-01
Attending: SURGERY
Payer: COMMERCIAL

## 2020-04-01 ENCOUNTER — TELEPHONE (OUTPATIENT)
Dept: PHYSICAL THERAPY | Facility: HOSPITAL | Age: 45
End: 2020-04-01

## 2020-04-01 NOTE — TELEPHONE ENCOUNTER
Pt cx todays visit due to family emergency. Patient wanted to reschedule appt but not until May due to COVID-19.

## 2020-04-06 ENCOUNTER — TELEPHONE (OUTPATIENT)
Dept: SURGERY | Facility: CLINIC | Age: 45
End: 2020-04-06

## 2020-04-06 ENCOUNTER — TELEPHONE (OUTPATIENT)
Dept: HEMATOLOGY/ONCOLOGY | Facility: HOSPITAL | Age: 45
End: 2020-04-06

## 2020-04-06 NOTE — TELEPHONE ENCOUNTER
Spoke with [facility] in regards to her [employer]. The prescription that was sent says take 2 capsules, 75 mg total, daily. Her bottle reads 1 capsule daily. Will send another script to [facility] indicating 75 mg daily. Verbalized understanding.

## 2020-04-06 NOTE — TELEPHONE ENCOUNTER
Spoke with patient and due to COVID-19 we are rescheduling non urgent follow ups. Appointment changed to 5/27/20 at 10:30am in [facility]. Patient voiced understanding.

## 2020-04-28 ENCOUNTER — TELEPHONE (OUTPATIENT)
Dept: RADIATION ONCOLOGY | Facility: HOSPITAL | Age: 45
End: 2020-04-28

## 2020-04-29 ENCOUNTER — APPOINTMENT (OUTPATIENT)
Dept: RADIATION ONCOLOGY | Facility: HOSPITAL | Age: 45
End: 2020-04-29
Attending: RADIOLOGY
Payer: COMMERCIAL

## 2020-04-29 ENCOUNTER — TELEPHONE (OUTPATIENT)
Dept: PHYSICAL THERAPY | Facility: HOSPITAL | Age: 45
End: 2020-04-29

## 2020-05-01 ENCOUNTER — TELEMEDICINE (OUTPATIENT)
Dept: SURGERY | Facility: CLINIC | Age: 45
End: 2020-05-01

## 2020-05-01 DIAGNOSIS — Z90.13 ABSENCE OF BREAST, BILATERAL: Primary | ICD-10-CM

## 2020-05-01 PROCEDURE — 99212 OFFICE O/P EST SF 10 MIN: CPT | Performed by: SURGERY

## 2020-05-01 NOTE — PROGRESS NOTES
The patient verbally consents to a Virtual service on 05/01/20 . The patient understands and accepts financial responsibility for any deductible, co-insurance and/or co-pays associated with this service.   The patient was in the state of [place] during this enco proceed.

## 2020-05-04 ENCOUNTER — APPOINTMENT (OUTPATIENT)
Dept: PHYSICAL THERAPY | Facility: HOSPITAL | Age: 45
End: 2020-05-04
Attending: SURGERY
Payer: COMMERCIAL

## 2020-05-15 ENCOUNTER — TELEMEDICINE (OUTPATIENT)
Dept: SURGERY | Facility: CLINIC | Age: 45
End: 2020-05-15

## 2020-05-15 DIAGNOSIS — Z90.13 ABSENCE OF BREAST, BILATERAL: Primary | ICD-10-CM

## 2020-05-21 ENCOUNTER — DOCUMENTATION ONLY (OUTPATIENT)
Dept: SURGERY | Facility: CLINIC | Age: 45
End: 2020-05-21

## 2020-05-21 NOTE — PATIENT INSTRUCTIONS
Surgeon: Dr. Alvaro Salgado      Tel:  [phone]    Fax: [phone]     Surgery/Procedure: removal of bilateral tissue expanders, placement of silicone implants, fat grafting to bilateral reconstructed breasts              Outpatient, general anesthesia

## 2020-05-22 DIAGNOSIS — Z17.0 MALIGNANT NEOPLASM OF OVERLAPPING SITES OF LEFT BREAST IN FEMALE, ESTROGEN RECEPTOR POSITIVE (HCC): Primary | ICD-10-CM

## 2020-05-22 DIAGNOSIS — D05.01 NEOPLASM OF RIGHT BREAST, PRIMARY TUMOR STAGING CATEGORY TIS: LOBULAR CARCINOMA IN SITU (LCIS): ICD-10-CM

## 2020-05-22 DIAGNOSIS — C50.812 MALIGNANT NEOPLASM OF OVERLAPPING SITES OF LEFT BREAST IN FEMALE, ESTROGEN RECEPTOR POSITIVE (HCC): Primary | ICD-10-CM

## 2020-05-26 ENCOUNTER — TELEPHONE (OUTPATIENT)
Dept: HEMATOLOGY/ONCOLOGY | Facility: HOSPITAL | Age: 45
End: 2020-05-26

## 2020-05-26 NOTE — TELEPHONE ENCOUNTER
Left message at home and on cell for the time change. We will see her right after she sees Dr Roberts Me.

## 2020-05-27 ENCOUNTER — OFFICE VISIT (OUTPATIENT)
Dept: SURGERY | Facility: CLINIC | Age: 45
End: 2020-05-27
Payer: COMMERCIAL

## 2020-05-27 ENCOUNTER — OFFICE VISIT (OUTPATIENT)
Dept: HEMATOLOGY/ONCOLOGY | Facility: HOSPITAL | Age: 45
End: 2020-05-27
Attending: INTERNAL MEDICINE
Payer: COMMERCIAL

## 2020-05-27 VITALS
RESPIRATION RATE: 16 BRPM | SYSTOLIC BLOOD PRESSURE: 116 MMHG | OXYGEN SATURATION: 96 % | DIASTOLIC BLOOD PRESSURE: 67 MMHG | HEART RATE: 79 BPM

## 2020-05-27 DIAGNOSIS — C50.912 MALIGNANT NEOPLASM OF LEFT FEMALE BREAST, UNSPECIFIED ESTROGEN RECEPTOR STATUS, UNSPECIFIED SITE OF BREAST (HCC): Primary | ICD-10-CM

## 2020-05-27 DIAGNOSIS — Z17.0 MALIGNANT NEOPLASM OF OVERLAPPING SITES OF LEFT BREAST IN FEMALE, ESTROGEN RECEPTOR POSITIVE (HCC): Primary | ICD-10-CM

## 2020-05-27 DIAGNOSIS — R23.2 HOT FLASHES: ICD-10-CM

## 2020-05-27 DIAGNOSIS — Z79.810 LONG-TERM CURRENT USE OF TAMOXIFEN: ICD-10-CM

## 2020-05-27 DIAGNOSIS — Z90.13 ABSENCE OF BREAST, BILATERAL: ICD-10-CM

## 2020-05-27 DIAGNOSIS — C50.812 MALIGNANT NEOPLASM OF OVERLAPPING SITES OF LEFT BREAST IN FEMALE, ESTROGEN RECEPTOR POSITIVE (HCC): Primary | ICD-10-CM

## 2020-05-27 PROCEDURE — 99214 OFFICE O/P EST MOD 30 MIN: CPT | Performed by: INTERNAL MEDICINE

## 2020-05-27 PROCEDURE — 99214 OFFICE O/P EST MOD 30 MIN: CPT | Performed by: SURGERY

## 2020-05-27 NOTE — PROGRESS NOTES
Breast Surgery Post-Operative Visit    Diagnosis:   Left invasive ductal carinoma status post bilateral nipple sparing mastectomies with left axillary lymph node dissection and immediate bilateral reconstruction with expander implant Marina Loyola) on October 10 lymph node dissection and expander placement. She has done well since surgery with no acute concerns related to bilateral chest wall since her last visit.   She denies any significant lymphedema or signs of concern in her left upper extremity on the side of pregnancy. Medications:    Venlafaxine HCl ER 75 MG Oral Capsule SR 24 Hr, Take 1 capsule (75 mg total) by mouth daily. , Disp: 90 capsule, Rfl: 1  Tamoxifen Citrate 20 MG Oral Tab, Take 1 tablet (20 mg total) by mouth daily. , Disp: 90 tablet, Rfl: 3  va swelling of the legs or chest pain while walking.     Breasts:  See history of present illness    Gastrointestinal:     There is no history of difficulty or pain with swallowing, reflux symptoms, vomiting, dark or bloody stools,+ constipation, yellowing of no thyromegaly. Range of motion is good at both shoulders. Breasts are  symmetrically absent with intact tissue expander based reconstruction. There is no evidence of local recurrence bilaterally.   Specifically, there is no palpable masses, skin changes o physical therapy may be warranted in the future if she identifies any limitations or restrictions. She was given ample opportunity for questions and those questions were answered to her satisfaction.      This encounter lasted a total of 25 minutes, more th

## 2020-05-27 NOTE — PROGRESS NOTES
Cancer Center Progress Note    Patient Name: Thalia Bosworth   YOB: 1975   Medical Record Number: [id number]   Attending Physician: Alfredo Galan M.D.      Chief Complaint:  Breast Cancer    Oncology History:   August 30, 2019: Mammo: nodularity breast in female, [facility] 9/20/2019   • Visual impairment     wears glasses       Past Surgical History:  Past Surgical History:   Procedure Laterality Date   • AXILLARY NODE DISSECTION Left 10/10/2019    Performed by Shola Ruiz and oriented x 3, not in acute distress. HEENT: EOMs intact. PERRL. Oropharynx is clear. Neck: No palpable lymphadenopathy. Neck is supple. Lymphatics:  There is no palpable peripheral lymphadenopathy   Chest: Symmetric expansion, nonlabored breathing inked anterior inferior margin and LN with focal extension. Status post PMRT  oncotype score of 12, recurrence rate at 9 yrs at 13% with AI/Casas alone  - Tamoxifen 5-10 years, change to AI after menopause.  Continue yearly gyn exam   - Continue q 3 mo exams

## 2020-06-03 ENCOUNTER — OFFICE VISIT (OUTPATIENT)
Dept: PHYSICAL THERAPY | Facility: HOSPITAL | Age: 45
End: 2020-06-03
Attending: SURGERY
Payer: COMMERCIAL

## 2020-06-03 PROCEDURE — 97110 THERAPEUTIC EXERCISES: CPT | Performed by: PHYSICAL THERAPIST

## 2020-06-03 PROCEDURE — 97140 MANUAL THERAPY 1/> REGIONS: CPT | Performed by: PHYSICAL THERAPIST

## 2020-06-03 NOTE — PROGRESS NOTES
Referring Physician: Dr. Concepción Durham  Malignant neoplasm of left female breast, unspecified estrogen receptor status, unspecified site of breast [facility]) (A46.930)     Date of onset: 10/10/19 Date of Service: 11/5/2019       Fall Risk: NONE          Progress Summa glasses         Pain: 0 \"just the stiffness\"    Subjective:   \"It just feels really tight on the left side\", \"I want to get a compression sleeve, I'm nervous about getting lymphedema\"   Objective:    6/3/2020:  - AROM shoulders:                Flex: R 11/12/2019 11/15/2019 11/19/2019 11/25/2019 6/3/93028   Visit # 1/10  2/10 3/10 4/10 5/10 6/10 7/10   Manual Therapy (25 minutes)  STM: provided STM to bilateral axilla, pect, and medial arms.  Additional time spent over L axilla cording - several cavitatio to improve flexibility    Compression:  - fitted for compression sleeve and gauntlet (prophylactic)  Palm 18.5  Wrist: 15.8  Forearm: 23.3  Upper arm: 29.9  Length 41  Color preference: beige  Will contact [facility] to order     There Ex ( 15 minute Self-Care  (5 minutes):   Management/Education: Explained Lymphedema diagnosis; informed patient of lymphedema precautions and risk reduction practices, and importance of skin care.  Discussed and demonstrated bandaging and compression options including v

## 2020-06-08 ENCOUNTER — OFFICE VISIT (OUTPATIENT)
Dept: PHYSICAL THERAPY | Facility: HOSPITAL | Age: 45
End: 2020-06-08
Attending: SURGERY
Payer: COMMERCIAL

## 2020-06-08 PROCEDURE — 97110 THERAPEUTIC EXERCISES: CPT

## 2020-06-08 PROCEDURE — 97140 MANUAL THERAPY 1/> REGIONS: CPT

## 2020-06-08 NOTE — PROGRESS NOTES
Referring Physician: Dr. Presley Rosenbaum  Malignant neoplasm of left female breast, unspecified estrogen receptor status, unspecified site of breast [facility]) (A39.611)     Date of onset: 10/10/19 Date of Service: 11/5/2019       Fall Risk: NONE                    Reha breast incisions and left axilla  AROM/Strength:   - AROM shoulders:                Flex: R 136, L 96               Abd: R 125, L 80               ER/IR: WFL   - strength not tested due to recent surgery and limited ROM        Posture: guarded movements of has not used her home pulleys, reviewed set up and flex/abduction exercises. Discussed that it is important for her to continue with daily stretching to better prepare for her upcoming surgery.              Assessment: Pt encouraged to continue with daily

## 2020-06-15 ENCOUNTER — OFFICE VISIT (OUTPATIENT)
Dept: PHYSICAL THERAPY | Facility: HOSPITAL | Age: 45
End: 2020-06-15
Attending: SURGERY
Payer: COMMERCIAL

## 2020-06-15 PROCEDURE — 97140 MANUAL THERAPY 1/> REGIONS: CPT

## 2020-06-15 PROCEDURE — 97110 THERAPEUTIC EXERCISES: CPT

## 2020-06-15 NOTE — PROGRESS NOTES
Referring Physician: Dr. Katy Rodriguez  Malignant neoplasm of left female breast, unspecified estrogen receptor status, unspecified site of breast [facility]) (T13.532)     Date of onset: 10/10/19 Date of Service: 11/5/2019       Fall Risk: NONE                    Reha Flex: R 136, L 96               Abd: R 125, L 80               ER/IR: WFL   - strength not tested due to recent surgery and limited ROM        Posture: guarded movements of UEs     Palpation: tenderness L medial upper arm, axilla, and trunk     Edema/Tissu washing and lying garment flat to dry. To wear the sleeve for a few days to ensure good fit. Instructed pt to mk her calendar for later in the year to order 1 or 2 more sleeves once her deductible is met after her upcoming surgery.    There Ex (10 min) Decongestive Therapy      Once a week to increase ROM and flexibility  Order prophylactic compression garment      Treatment will include: Complete Decongestive Therapy: Manual Therapy, Self-Care/Home Management Training, Therapeutic Exercise, Neuromuscula

## 2020-06-22 ENCOUNTER — TELEPHONE (OUTPATIENT)
Dept: PHYSICAL THERAPY | Facility: HOSPITAL | Age: 45
End: 2020-06-22

## 2020-06-22 ENCOUNTER — APPOINTMENT (OUTPATIENT)
Dept: PHYSICAL THERAPY | Facility: HOSPITAL | Age: 45
End: 2020-06-22
Attending: SURGERY
Payer: COMMERCIAL

## 2020-06-22 NOTE — TELEPHONE ENCOUNTER
Pt cx todays appt in PT via [employer] stating she felt good enough to continue w/ the stretches on her own. Will discharge from PT in a couple of weeks if pt does not return.

## 2020-07-24 NOTE — PROGRESS NOTES
Pt has attended 9 visits in Physical Therapy. Pt did not return for additional therapy.      Plan: Discharge from PT

## 2020-08-21 RX ORDER — METOCLOPRAMIDE 10 MG/1
10 TABLET ORAL ONCE
Status: CANCELLED | OUTPATIENT
Start: 2020-08-21 | End: 2020-08-21

## 2020-08-21 RX ORDER — VENLAFAXINE HYDROCHLORIDE 150 MG/1
150 CAPSULE, EXTENDED RELEASE ORAL EVERY EVENING
COMMUNITY
End: 2020-10-01

## 2020-08-24 ENCOUNTER — LAB ENCOUNTER (OUTPATIENT)
Dept: LAB | Age: 45
End: 2020-08-24
Attending: SURGERY
Payer: COMMERCIAL

## 2020-08-24 DIAGNOSIS — Z01.818 PREOP TESTING: ICD-10-CM

## 2020-08-25 DIAGNOSIS — C50.912 MALIGNANT NEOPLASM OF LEFT FEMALE BREAST, UNSPECIFIED ESTROGEN RECEPTOR STATUS, UNSPECIFIED SITE OF BREAST (HCC): Primary | ICD-10-CM

## 2020-08-26 ENCOUNTER — ANESTHESIA EVENT (OUTPATIENT)
Dept: SURGERY | Facility: HOSPITAL | Age: 45
End: 2020-08-26
Payer: COMMERCIAL

## 2020-08-26 ENCOUNTER — HOSPITAL ENCOUNTER (OUTPATIENT)
Facility: HOSPITAL | Age: 45
Setting detail: HOSPITAL OUTPATIENT SURGERY
Discharge: HOME | End: 2020-08-26
Attending: SURGERY | Admitting: SURGERY
Payer: COMMERCIAL

## 2020-08-26 ENCOUNTER — ANESTHESIA (OUTPATIENT)
Dept: SURGERY | Facility: HOSPITAL | Age: 45
End: 2020-08-26
Payer: COMMERCIAL

## 2020-08-26 VITALS
HEIGHT: 65 IN | OXYGEN SATURATION: 97 % | HEART RATE: 77 BPM | SYSTOLIC BLOOD PRESSURE: 108 MMHG | WEIGHT: 137 LBS | RESPIRATION RATE: 18 BRPM | TEMPERATURE: 98 F | DIASTOLIC BLOOD PRESSURE: 59 MMHG | BODY MASS INDEX: 22.82 KG/M2

## 2020-08-26 DIAGNOSIS — Z01.818 PREOP TESTING: Primary | ICD-10-CM

## 2020-08-26 DIAGNOSIS — Z17.0 MALIGNANT NEOPLASM OF OVERLAPPING SITES OF LEFT BREAST IN FEMALE, ESTROGEN RECEPTOR POSITIVE (HCC): ICD-10-CM

## 2020-08-26 DIAGNOSIS — D05.01 NEOPLASM OF RIGHT BREAST, PRIMARY TUMOR STAGING CATEGORY TIS: LOBULAR CARCINOMA IN SITU (LCIS): ICD-10-CM

## 2020-08-26 DIAGNOSIS — C50.812 MALIGNANT NEOPLASM OF OVERLAPPING SITES OF LEFT BREAST IN FEMALE, ESTROGEN RECEPTOR POSITIVE (HCC): ICD-10-CM

## 2020-08-26 LAB — B-HCG UR QL: NEGATIVE

## 2020-08-26 PROCEDURE — 88305 TISSUE EXAM BY PATHOLOGIST: CPT | Performed by: SURGERY

## 2020-08-26 PROCEDURE — 0HPU0NZ REMOVAL OF TISSUE EXPANDER FROM LEFT BREAST, OPEN APPROACH: ICD-10-PCS | Performed by: SURGERY

## 2020-08-26 PROCEDURE — 0HRV0JZ REPLACEMENT OF BILATERAL BREAST WITH SYNTHETIC SUBSTITUTE, OPEN APPROACH: ICD-10-PCS | Performed by: SURGERY

## 2020-08-26 PROCEDURE — 0JD83ZZ EXTRACTION OF ABDOMEN SUBCUTANEOUS TISSUE AND FASCIA, PERCUTANEOUS APPROACH: ICD-10-PCS | Performed by: SURGERY

## 2020-08-26 PROCEDURE — 0HRV37Z REPLACEMENT OF BILATERAL BREAST WITH AUTOLOGOUS TISSUE SUBSTITUTE, PERCUTANEOUS APPROACH: ICD-10-PCS | Performed by: SURGERY

## 2020-08-26 PROCEDURE — 81025 URINE PREGNANCY TEST: CPT

## 2020-08-26 PROCEDURE — 88300 SURGICAL PATH GROSS: CPT | Performed by: SURGERY

## 2020-08-26 PROCEDURE — 0HPT0NZ REMOVAL OF TISSUE EXPANDER FROM RIGHT BREAST, OPEN APPROACH: ICD-10-PCS | Performed by: SURGERY

## 2020-08-26 RX ORDER — ONDANSETRON 4 MG/1
4 TABLET, FILM COATED ORAL EVERY 8 HOURS PRN
Qty: 20 TABLET | Refills: 0 | Status: SHIPPED | OUTPATIENT
Start: 2020-08-26 | End: 2020-09-18 | Stop reason: ALTCHOICE

## 2020-08-26 RX ORDER — LIDOCAINE HYDROCHLORIDE AND EPINEPHRINE 10; 10 MG/ML; UG/ML
INJECTION, SOLUTION INFILTRATION; PERINEURAL AS NEEDED
Status: DISCONTINUED | OUTPATIENT
Start: 2020-08-26 | End: 2020-08-26 | Stop reason: HOSPADM

## 2020-08-26 RX ORDER — HYDROMORPHONE HYDROCHLORIDE 1 MG/ML
0.4 INJECTION, SOLUTION INTRAMUSCULAR; INTRAVENOUS; SUBCUTANEOUS EVERY 5 MIN PRN
Status: DISCONTINUED | OUTPATIENT
Start: 2020-08-26 | End: 2020-08-26

## 2020-08-26 RX ORDER — ROCURONIUM BROMIDE 10 MG/ML
INJECTION, SOLUTION INTRAVENOUS AS NEEDED
Status: DISCONTINUED | OUTPATIENT
Start: 2020-08-26 | End: 2020-08-26 | Stop reason: SURG

## 2020-08-26 RX ORDER — CEFAZOLIN SODIUM/WATER 2 G/20 ML
2 SYRINGE (ML) INTRAVENOUS ONCE
Status: COMPLETED | OUTPATIENT
Start: 2020-08-26 | End: 2020-08-26

## 2020-08-26 RX ORDER — EPHEDRINE SULFATE 50 MG/ML
INJECTION, SOLUTION INTRAVENOUS AS NEEDED
Status: DISCONTINUED | OUTPATIENT
Start: 2020-08-26 | End: 2020-08-26 | Stop reason: SURG

## 2020-08-26 RX ORDER — SODIUM CHLORIDE, SODIUM LACTATE, POTASSIUM CHLORIDE, CALCIUM CHLORIDE 600; 310; 30; 20 MG/100ML; MG/100ML; MG/100ML; MG/100ML
INJECTION, SOLUTION INTRAVENOUS CONTINUOUS
Status: DISCONTINUED | OUTPATIENT
Start: 2020-08-26 | End: 2020-08-26

## 2020-08-26 RX ORDER — FAMOTIDINE 20 MG/1
20 TABLET ORAL ONCE
Status: DISCONTINUED | OUTPATIENT
Start: 2020-08-26 | End: 2020-08-26 | Stop reason: HOSPADM

## 2020-08-26 RX ORDER — CEPHALEXIN 500 MG/1
500 CAPSULE ORAL 4 TIMES DAILY
Qty: 20 CAPSULE | Refills: 0 | Status: SHIPPED | OUTPATIENT
Start: 2020-08-26 | End: 2020-08-31

## 2020-08-26 RX ORDER — ACETAMINOPHEN 500 MG
1000 TABLET ORAL ONCE
Status: COMPLETED | OUTPATIENT
Start: 2020-08-26 | End: 2020-08-26

## 2020-08-26 RX ORDER — HYDROCODONE BITARTRATE AND ACETAMINOPHEN 5; 325 MG/1; MG/1
1 TABLET ORAL AS NEEDED
Status: DISCONTINUED | OUTPATIENT
Start: 2020-08-26 | End: 2020-08-26

## 2020-08-26 RX ORDER — MORPHINE SULFATE 10 MG/ML
6 INJECTION, SOLUTION INTRAMUSCULAR; INTRAVENOUS EVERY 10 MIN PRN
Status: DISCONTINUED | OUTPATIENT
Start: 2020-08-26 | End: 2020-08-26

## 2020-08-26 RX ORDER — ONDANSETRON 2 MG/ML
INJECTION INTRAMUSCULAR; INTRAVENOUS AS NEEDED
Status: DISCONTINUED | OUTPATIENT
Start: 2020-08-26 | End: 2020-08-26 | Stop reason: SURG

## 2020-08-26 RX ORDER — HYDROCODONE BITARTRATE AND ACETAMINOPHEN 5; 325 MG/1; MG/1
1-2 TABLET ORAL EVERY 4 HOURS PRN
Qty: 40 TABLET | Refills: 0 | Status: SHIPPED | OUTPATIENT
Start: 2020-08-26 | End: 2020-09-18 | Stop reason: ALTCHOICE

## 2020-08-26 RX ORDER — ONDANSETRON 2 MG/ML
4 INJECTION INTRAMUSCULAR; INTRAVENOUS ONCE AS NEEDED
Status: DISCONTINUED | OUTPATIENT
Start: 2020-08-26 | End: 2020-08-26

## 2020-08-26 RX ORDER — HYDROCODONE BITARTRATE AND ACETAMINOPHEN 5; 325 MG/1; MG/1
2 TABLET ORAL AS NEEDED
Status: DISCONTINUED | OUTPATIENT
Start: 2020-08-26 | End: 2020-08-26

## 2020-08-26 RX ORDER — HYDROMORPHONE HYDROCHLORIDE 1 MG/ML
0.6 INJECTION, SOLUTION INTRAMUSCULAR; INTRAVENOUS; SUBCUTANEOUS EVERY 5 MIN PRN
Status: DISCONTINUED | OUTPATIENT
Start: 2020-08-26 | End: 2020-08-26

## 2020-08-26 RX ORDER — MIDAZOLAM HYDROCHLORIDE 1 MG/ML
INJECTION INTRAMUSCULAR; INTRAVENOUS AS NEEDED
Status: DISCONTINUED | OUTPATIENT
Start: 2020-08-26 | End: 2020-08-26 | Stop reason: SURG

## 2020-08-26 RX ORDER — ACETAMINOPHEN 650 MG
TABLET, EXTENDED RELEASE ORAL AS NEEDED
Status: DISCONTINUED | OUTPATIENT
Start: 2020-08-26 | End: 2020-08-26 | Stop reason: HOSPADM

## 2020-08-26 RX ORDER — NEOSTIGMINE METHYLSULFATE 1 MG/ML
INJECTION INTRAVENOUS AS NEEDED
Status: DISCONTINUED | OUTPATIENT
Start: 2020-08-26 | End: 2020-08-26 | Stop reason: SURG

## 2020-08-26 RX ORDER — DEXAMETHASONE SODIUM PHOSPHATE 4 MG/ML
VIAL (ML) INJECTION AS NEEDED
Status: DISCONTINUED | OUTPATIENT
Start: 2020-08-26 | End: 2020-08-26 | Stop reason: SURG

## 2020-08-26 RX ORDER — NALOXONE HYDROCHLORIDE 0.4 MG/ML
80 INJECTION, SOLUTION INTRAMUSCULAR; INTRAVENOUS; SUBCUTANEOUS AS NEEDED
Status: DISCONTINUED | OUTPATIENT
Start: 2020-08-26 | End: 2020-08-26

## 2020-08-26 RX ORDER — HYDROMORPHONE HYDROCHLORIDE 1 MG/ML
0.2 INJECTION, SOLUTION INTRAMUSCULAR; INTRAVENOUS; SUBCUTANEOUS EVERY 5 MIN PRN
Status: DISCONTINUED | OUTPATIENT
Start: 2020-08-26 | End: 2020-08-26

## 2020-08-26 RX ORDER — GLYCOPYRROLATE 0.2 MG/ML
INJECTION, SOLUTION INTRAMUSCULAR; INTRAVENOUS AS NEEDED
Status: DISCONTINUED | OUTPATIENT
Start: 2020-08-26 | End: 2020-08-26 | Stop reason: SURG

## 2020-08-26 RX ORDER — LIDOCAINE HYDROCHLORIDE 10 MG/ML
INJECTION, SOLUTION EPIDURAL; INFILTRATION; INTRACAUDAL; PERINEURAL AS NEEDED
Status: DISCONTINUED | OUTPATIENT
Start: 2020-08-26 | End: 2020-08-26 | Stop reason: SURG

## 2020-08-26 RX ORDER — DOCUSATE SODIUM 100 MG/1
100 CAPSULE, LIQUID FILLED ORAL 2 TIMES DAILY
Qty: 40 CAPSULE | Refills: 0 | Status: SHIPPED | OUTPATIENT
Start: 2020-08-26 | End: 2020-09-18 | Stop reason: ALTCHOICE

## 2020-08-26 RX ORDER — PHENYLEPHRINE HCL 10 MG/ML
VIAL (ML) INJECTION AS NEEDED
Status: DISCONTINUED | OUTPATIENT
Start: 2020-08-26 | End: 2020-08-26 | Stop reason: SURG

## 2020-08-26 RX ORDER — MORPHINE SULFATE 4 MG/ML
2 INJECTION, SOLUTION INTRAMUSCULAR; INTRAVENOUS EVERY 10 MIN PRN
Status: DISCONTINUED | OUTPATIENT
Start: 2020-08-26 | End: 2020-08-26

## 2020-08-26 RX ORDER — MORPHINE SULFATE 4 MG/ML
4 INJECTION, SOLUTION INTRAMUSCULAR; INTRAVENOUS EVERY 10 MIN PRN
Status: DISCONTINUED | OUTPATIENT
Start: 2020-08-26 | End: 2020-08-26

## 2020-08-26 RX ADMIN — ROCURONIUM BROMIDE 5 MG: 10 INJECTION, SOLUTION INTRAVENOUS at 08:49:00

## 2020-08-26 RX ADMIN — CEFAZOLIN SODIUM/WATER 2 G: 2 G/20 ML SYRINGE (ML) INTRAVENOUS at 07:38:00

## 2020-08-26 RX ADMIN — EPHEDRINE SULFATE 5 MG: 50 INJECTION, SOLUTION INTRAVENOUS at 09:03:00

## 2020-08-26 RX ADMIN — PHENYLEPHRINE HCL 100 MCG: 10 MG/ML VIAL (ML) INJECTION at 09:41:00

## 2020-08-26 RX ADMIN — EPHEDRINE SULFATE 2.5 MG: 50 INJECTION, SOLUTION INTRAVENOUS at 08:44:00

## 2020-08-26 RX ADMIN — MIDAZOLAM HYDROCHLORIDE 2 MG: 1 INJECTION INTRAMUSCULAR; INTRAVENOUS at 07:28:00

## 2020-08-26 RX ADMIN — GLYCOPYRROLATE 0.6 MG: 0.2 INJECTION, SOLUTION INTRAMUSCULAR; INTRAVENOUS at 09:50:00

## 2020-08-26 RX ADMIN — PHENYLEPHRINE HCL 100 MCG: 10 MG/ML VIAL (ML) INJECTION at 09:13:00

## 2020-08-26 RX ADMIN — EPHEDRINE SULFATE 5 MG: 50 INJECTION, SOLUTION INTRAVENOUS at 08:58:00

## 2020-08-26 RX ADMIN — ONDANSETRON 4 MG: 2 INJECTION INTRAMUSCULAR; INTRAVENOUS at 09:50:00

## 2020-08-26 RX ADMIN — PHENYLEPHRINE HCL 100 MCG: 10 MG/ML VIAL (ML) INJECTION at 09:24:00

## 2020-08-26 RX ADMIN — ROCURONIUM BROMIDE 50 MG: 10 INJECTION, SOLUTION INTRAVENOUS at 07:34:00

## 2020-08-26 RX ADMIN — NEOSTIGMINE METHYLSULFATE 3 MG: 1 INJECTION INTRAVENOUS at 09:50:00

## 2020-08-26 RX ADMIN — ROCURONIUM BROMIDE 5 MG: 10 INJECTION, SOLUTION INTRAVENOUS at 09:24:00

## 2020-08-26 RX ADMIN — DEXAMETHASONE SODIUM PHOSPHATE 4 MG: 4 MG/ML VIAL (ML) INJECTION at 07:41:00

## 2020-08-26 RX ADMIN — SODIUM CHLORIDE, SODIUM LACTATE, POTASSIUM CHLORIDE, CALCIUM CHLORIDE: 600; 310; 30; 20 INJECTION, SOLUTION INTRAVENOUS at 09:51:00

## 2020-08-26 RX ADMIN — SODIUM CHLORIDE, SODIUM LACTATE, POTASSIUM CHLORIDE, CALCIUM CHLORIDE: 600; 310; 30; 20 INJECTION, SOLUTION INTRAVENOUS at 09:07:00

## 2020-08-26 RX ADMIN — LIDOCAINE HYDROCHLORIDE 25 MG: 10 INJECTION, SOLUTION EPIDURAL; INFILTRATION; INTRACAUDAL; PERINEURAL at 07:34:00

## 2020-08-26 RX ADMIN — EPHEDRINE SULFATE 5 MG: 50 INJECTION, SOLUTION INTRAVENOUS at 07:40:00

## 2020-08-26 NOTE — H&P
No change in H&P from 8/4. We reviewed the plan for removal of bilateral breast tissue expanders, placement of smooth, round, silicone implants.   We reviewed the risks of surgery including but not limited to bleeding, infection, scarring, delayed wound hea

## 2020-08-26 NOTE — OPERATIVE REPORT
[facility]    PATIENT'S NAME: Main Rodriguez   ATTENDING PHYSICIAN: Enrique Tolentino MD   OPERATING PHYSICIAN: Enrique Tolentino MD   PATIENT ACCOUNT#:   [id number]    LOCATION:  [facility] [address] PACU [address] [place] [address]  MEDICAL RECORD #:   [id number]       DATE Madeleine Brunner The position of the left inframammary fold was transposed with a right breast skin envelope where capsulorrhaphy was required. Areas to be fat grafted were marked. Finally, areas of central abdominal flank lipodystrophy were marked for liposuction.   The left breast.  The inframammary fold incision was created with a scalpel and deepened to the underlying capsule with electrocautery. A transverse capsulotomy was then created, and the tissue expander was encountered and noted to be intact.   It was puncture deep dermis was reapproximated with interrupted 3-0 Vicryl deep dermal suture and a running 4-0 Monocryl subcuticular suture. Dermabond and Steri-Strips were placed on all the incisions. Fluff gauze and a surgical bra were placed on the breasts.   \[facility]\""

## 2020-08-26 NOTE — ANESTHESIA PREPROCEDURE EVALUATION
Anesthesia PreOp Note    HPI:     Maximo Romero is a 54year old female who presents for preoperative consultation requested by: Lion Castillo MD    Date of Surgery: 8/26/2020    Procedure(s):  BREAST RECONSTRUCTION SECOND STAGE/ REVISION  Indication:  Any Bishop Performed by Lisa Weaver MD at [facility] OR   • BREAST RECONSTRUCTION/ IMMEDIATE/EXPANDER Bilateral 10/10/2019    Performed by Rogerio Haq MD at [facility] OR   • [address] Left 10/10/2019    Performed by Lsia Weaver, Social Needs      Financial resource strain: Not on file      Food insecurity:        Worry: Not on file        Inability: Not on file      Transportation needs:        Medical: Not on file        Non-medical: Not on file    Tobacco Use      Smoking stat 1790 08/26/20  0703   BP:   106/67   Pulse:   71   Resp:   16   TempSrc:   Oral   SpO2:   99%   Weight: 61.2 kg (135 lb) 62.1 kg (137 lb)    Height: 1.651 m (5' 5\") 1.651 m (5' 5\")         Anesthesia Evaluation     Patient summary reviewed and Nursing no

## 2020-08-26 NOTE — ANESTHESIA POSTPROCEDURE EVALUATION
Patient:  Latonya Lino    Procedure Summary     Date:  08/26/20 Room / Location:  [address] [place] [address] MAIN OR 02 / [facility] MAIN OR    Anesthesia Start:  4479 Anesthesia Stop:  1372    Procedure:  BREAST RECONSTRUCTION SECOND STAGE/ REVISION (Bilateral Breast) Diagnosis:

## 2020-08-26 NOTE — BRIEF OP NOTE
Pre-Operative Diagnosis: Malignant neoplasm of overlapping sites of left breast in female, estrogen receptor positive ([facility] [address] [C50.812, Z17.0]  Neoplasm of right breast, primary tumor staging category Tis: lobular carcinoma in situ (LCIS) [D05.01]     Post-O

## 2020-08-26 NOTE — ANESTHESIA PROCEDURE NOTES
Airway  Urgency: Elective      General Information and Staff    Patient location during procedure: OR  Anesthesiologist: Brenda Garrett MD  Resident/CRNA: Kurt Palma CRNA  Performed: CRNA     Indications and Patient Condition  Indications for ai

## 2020-09-04 ENCOUNTER — OFFICE VISIT (OUTPATIENT)
Dept: SURGERY | Facility: CLINIC | Age: 45
End: 2020-09-04
Payer: COMMERCIAL

## 2020-09-04 DIAGNOSIS — Z90.13 ABSENCE OF BREAST, BILATERAL: Primary | ICD-10-CM

## 2020-09-04 PROCEDURE — 99024 POSTOP FOLLOW-UP VISIT: CPT | Performed by: PHYSICIAN ASSISTANT

## 2020-09-04 NOTE — PROGRESS NOTES
Justine Burroughs is a 54year old female who presents today for a follow-up after removal of bilateral breast tissue expanders, right breast capsulorrhaphy, placement of silicone gel implants (SSX Natrelle Inspira soft touch 420 mL) and autologous fat grafti

## 2020-09-18 ENCOUNTER — OFFICE VISIT (OUTPATIENT)
Dept: SURGERY | Facility: CLINIC | Age: 45
End: 2020-09-18
Payer: COMMERCIAL

## 2020-09-18 DIAGNOSIS — Z90.13 ABSENCE OF BREAST, BILATERAL: Primary | ICD-10-CM

## 2020-09-18 PROCEDURE — 99024 POSTOP FOLLOW-UP VISIT: CPT | Performed by: SURGERY

## 2020-09-18 NOTE — PROGRESS NOTES
Linnea Elizalde is a 54year old female who presents today for a follow-up. She denies fever and chills. She denies nausea, vomiting, diarrhea or constipation.        Physical Examination:  Breasts: Bilateral breast incisions are with intact Steri-Strips w

## 2020-10-01 ENCOUNTER — TELEPHONE (OUTPATIENT)
Dept: SURGERY | Facility: CLINIC | Age: 45
End: 2020-10-01

## 2020-10-01 ENCOUNTER — OFFICE VISIT (OUTPATIENT)
Dept: HEMATOLOGY/ONCOLOGY | Facility: HOSPITAL | Age: 45
End: 2020-10-01
Attending: INTERNAL MEDICINE
Payer: COMMERCIAL

## 2020-10-01 VITALS
HEIGHT: 65 IN | WEIGHT: 136.38 LBS | OXYGEN SATURATION: 99 % | SYSTOLIC BLOOD PRESSURE: 110 MMHG | BODY MASS INDEX: 22.72 KG/M2 | HEART RATE: 75 BPM | TEMPERATURE: 97 F | DIASTOLIC BLOOD PRESSURE: 67 MMHG | RESPIRATION RATE: 16 BRPM

## 2020-10-01 DIAGNOSIS — R23.2 HOT FLASHES: ICD-10-CM

## 2020-10-01 DIAGNOSIS — Z79.810 LONG-TERM CURRENT USE OF TAMOXIFEN: ICD-10-CM

## 2020-10-01 DIAGNOSIS — Z17.0 MALIGNANT NEOPLASM OF OVERLAPPING SITES OF LEFT BREAST IN FEMALE, ESTROGEN RECEPTOR POSITIVE (HCC): Primary | ICD-10-CM

## 2020-10-01 DIAGNOSIS — C50.812 MALIGNANT NEOPLASM OF OVERLAPPING SITES OF LEFT BREAST IN FEMALE, ESTROGEN RECEPTOR POSITIVE (HCC): Primary | ICD-10-CM

## 2020-10-01 DIAGNOSIS — Z90.13 ABSENCE OF BREAST, BILATERAL: ICD-10-CM

## 2020-10-01 PROCEDURE — 99214 OFFICE O/P EST MOD 30 MIN: CPT | Performed by: INTERNAL MEDICINE

## 2020-10-01 RX ORDER — VENLAFAXINE HYDROCHLORIDE 150 MG/1
150 CAPSULE, EXTENDED RELEASE ORAL EVERY EVENING
Qty: 90 CAPSULE | Refills: 3 | Status: SHIPPED | OUTPATIENT
Start: 2020-10-01 | End: 2020-12-30

## 2020-10-01 NOTE — PROGRESS NOTES
Cancer Center Progress Note    Patient Name: Juan David Roy   YOB: 1975   Medical Record Number: [id number]   Attending Physician: Azul Wang M.D.      Chief Complaint:  Breast Cancer    Oncology History:   August 30, 2019: Mammo: nodularity 9/20/2019   • Visual impairment     wears glasses       Past Surgical History:  Past Surgical History:   Procedure Laterality Date   • AXILLARY NODE DISSECTION Left 10/10/2019    Performed by Dwight Cardenas MD at [address] [place] [address] OR   • BREAST BIOPSY Left 09/0 systems reviewed and negative x12 except as listed above    Vital Signs:   10/01/20  0909   BP: 110/67   Pulse: 75   Resp: 16   Temp: 97 °F (36.1 °C)     Physical Examination:  General: Patient is alert and oriented x 3, not in acute distress.   HEENT: EOMs AI/Casas alone  - Tamoxifen 5-10 years, change to AI after menopause. Continue yearly gyn exam   - Continue q 3 mo exams. Right breast inferior fold dermatitis- will discuss with plastics    3. Depression/anxiety  4.  Hot flash  - Better on effexor at 14

## 2020-10-01 NOTE — TELEPHONE ENCOUNTER
Was notified that pt has redness on the left breast. Called patient she denies pain,swelling, warmth and fever. She is sending a photo to the department email and will be reviewed by Dr. Mandeep Shay once received.

## 2020-10-01 NOTE — TELEPHONE ENCOUNTER
Received message from Dr. Yesenia Gonzales who reports some irritation of the patient's left breast and the area of the inframammary fold incision. Photos were sent and show mild hyperpigmentation consistent with radiation change. There is no obvious erythema noted.

## 2020-10-13 ENCOUNTER — HOSPITAL ENCOUNTER (INPATIENT)
Facility: HOSPITAL | Age: 45
LOS: 1 days | Discharge: HOME | DRG: 761 | End: 2020-10-14
Attending: INTERNAL MEDICINE | Admitting: INTERNAL MEDICINE
Payer: COMMERCIAL

## 2020-10-13 ENCOUNTER — APPOINTMENT (OUTPATIENT)
Dept: ULTRASOUND IMAGING | Facility: HOSPITAL | Age: 45
DRG: 761 | End: 2020-10-13
Attending: NURSE PRACTITIONER
Payer: COMMERCIAL

## 2020-10-13 ENCOUNTER — APPOINTMENT (OUTPATIENT)
Dept: CT IMAGING | Facility: HOSPITAL | Age: 45
DRG: 761 | End: 2020-10-13
Attending: NURSE PRACTITIONER
Payer: COMMERCIAL

## 2020-10-13 DIAGNOSIS — N83.8 OVARIAN MASS, LEFT: Primary | ICD-10-CM

## 2020-10-13 DIAGNOSIS — R10.9 INTRACTABLE ABDOMINAL PAIN: ICD-10-CM

## 2020-10-13 PROCEDURE — 93975 VASCULAR STUDY: CPT | Performed by: NURSE PRACTITIONER

## 2020-10-13 PROCEDURE — 76830 TRANSVAGINAL US NON-OB: CPT | Performed by: NURSE PRACTITIONER

## 2020-10-13 PROCEDURE — 76856 US EXAM PELVIC COMPLETE: CPT | Performed by: NURSE PRACTITIONER

## 2020-10-13 PROCEDURE — 74177 CT ABD & PELVIS W/CONTRAST: CPT | Performed by: NURSE PRACTITIONER

## 2020-10-13 RX ORDER — METOCLOPRAMIDE HYDROCHLORIDE 5 MG/ML
INJECTION INTRAMUSCULAR; INTRAVENOUS
Status: COMPLETED
Start: 2020-10-13 | End: 2020-10-13

## 2020-10-13 RX ORDER — ONDANSETRON 2 MG/ML
4 INJECTION INTRAMUSCULAR; INTRAVENOUS ONCE
Status: COMPLETED | OUTPATIENT
Start: 2020-10-13 | End: 2020-10-13

## 2020-10-13 RX ORDER — METOCLOPRAMIDE HYDROCHLORIDE 5 MG/ML
10 INJECTION INTRAMUSCULAR; INTRAVENOUS ONCE
Status: COMPLETED | OUTPATIENT
Start: 2020-10-13 | End: 2020-10-13

## 2020-10-13 RX ORDER — MORPHINE SULFATE 4 MG/ML
4 INJECTION, SOLUTION INTRAMUSCULAR; INTRAVENOUS ONCE
Status: COMPLETED | OUTPATIENT
Start: 2020-10-13 | End: 2020-10-13

## 2020-10-14 VITALS
OXYGEN SATURATION: 100 % | WEIGHT: 136 LBS | BODY MASS INDEX: 22.66 KG/M2 | TEMPERATURE: 99 F | HEIGHT: 65 IN | RESPIRATION RATE: 16 BRPM | HEART RATE: 75 BPM | DIASTOLIC BLOOD PRESSURE: 67 MMHG | SYSTOLIC BLOOD PRESSURE: 105 MMHG

## 2020-10-14 PROCEDURE — 99223 1ST HOSP IP/OBS HIGH 75: CPT | Performed by: INTERNAL MEDICINE

## 2020-10-14 RX ORDER — MORPHINE SULFATE 2 MG/ML
1 INJECTION, SOLUTION INTRAMUSCULAR; INTRAVENOUS EVERY 2 HOUR PRN
Status: DISCONTINUED | OUTPATIENT
Start: 2020-10-14 | End: 2020-10-14

## 2020-10-14 RX ORDER — TRAMADOL HYDROCHLORIDE 50 MG/1
50 TABLET ORAL EVERY 6 HOURS PRN
Status: DISCONTINUED | OUTPATIENT
Start: 2020-10-14 | End: 2020-10-14

## 2020-10-14 RX ORDER — MORPHINE SULFATE 4 MG/ML
4 INJECTION, SOLUTION INTRAMUSCULAR; INTRAVENOUS EVERY 2 HOUR PRN
Status: DISCONTINUED | OUTPATIENT
Start: 2020-10-14 | End: 2020-10-14

## 2020-10-14 RX ORDER — ACETAMINOPHEN 325 MG/1
650 TABLET ORAL EVERY 4 HOURS PRN
Status: DISCONTINUED | OUTPATIENT
Start: 2020-10-14 | End: 2020-10-14

## 2020-10-14 RX ORDER — SODIUM CHLORIDE 9 MG/ML
INJECTION, SOLUTION INTRAVENOUS CONTINUOUS
Status: DISCONTINUED | OUTPATIENT
Start: 2020-10-14 | End: 2020-10-14

## 2020-10-14 RX ORDER — HYDROCODONE BITARTRATE AND ACETAMINOPHEN 5; 325 MG/1; MG/1
1 TABLET ORAL EVERY 4 HOURS PRN
Status: DISCONTINUED | OUTPATIENT
Start: 2020-10-14 | End: 2020-10-14

## 2020-10-14 RX ORDER — ENOXAPARIN SODIUM 100 MG/ML
40 INJECTION SUBCUTANEOUS DAILY
Status: DISCONTINUED | OUTPATIENT
Start: 2020-10-14 | End: 2020-10-14

## 2020-10-14 RX ORDER — TAMOXIFEN CITRATE 20 MG/1
20 TABLET ORAL EVERY EVENING
Status: DISCONTINUED | OUTPATIENT
Start: 2020-10-14 | End: 2020-10-14

## 2020-10-14 RX ORDER — HYDROCODONE BITARTRATE AND ACETAMINOPHEN 5; 325 MG/1; MG/1
2 TABLET ORAL EVERY 4 HOURS PRN
Status: DISCONTINUED | OUTPATIENT
Start: 2020-10-14 | End: 2020-10-14

## 2020-10-14 RX ORDER — MORPHINE SULFATE 2 MG/ML
2 INJECTION, SOLUTION INTRAMUSCULAR; INTRAVENOUS EVERY 2 HOUR PRN
Status: DISCONTINUED | OUTPATIENT
Start: 2020-10-14 | End: 2020-10-14

## 2020-10-14 RX ORDER — VENLAFAXINE HYDROCHLORIDE 75 MG/1
150 CAPSULE, EXTENDED RELEASE ORAL EVERY EVENING
Status: DISCONTINUED | OUTPATIENT
Start: 2020-10-14 | End: 2020-10-14

## 2020-10-14 RX ORDER — ONDANSETRON 2 MG/ML
4 INJECTION INTRAMUSCULAR; INTRAVENOUS EVERY 6 HOURS PRN
Status: DISCONTINUED | OUTPATIENT
Start: 2020-10-14 | End: 2020-10-14

## 2020-10-14 RX ORDER — SODIUM CHLORIDE 0.9 % (FLUSH) 0.9 %
3 SYRINGE (ML) INJECTION AS NEEDED
Status: DISCONTINUED | OUTPATIENT
Start: 2020-10-14 | End: 2020-10-14

## 2020-10-14 NOTE — PROGRESS NOTES
Pt arrived to floor via transport. A/o x 4. Room air. VS stable. Denies any nausea. Pt has pain 5/10 in LLQ of abdomen. PRN morphine given for pain. NPO status started at midnight, pt aware. Pt ambulates independently.    0.9NS infusing at 75ml/h

## 2020-10-14 NOTE — PLAN OF CARE
Problem: Patient Centered Care  Goal: Patient preferences are identified and integrated in the patient's plan of care  Description: Interventions:  - What would you like us to know as we care for you?   - Provide timely, complete, and accurate informatio

## 2020-10-14 NOTE — CONSULTS
[facility]    Consultation note    Kaylah Mace Patient Status:  Inpatient    3/12/1975 MRN [id number]   Location [facility] 4W/SW/SE Attending Nestor Hilton MD   Hosp Day # 1 PCP Ruby Monae MD        [facility]   Misty Cardenas representing a hemorrhagic cyst, and a 4.0 cm simple cyst that is likely physiologic. Both cysts are likely benign given the patient's age. Follow-up pelvic ultrasound   could be considered in 2-3 menstrual cycles to confirm resolution.   2. Unremarkable

## 2020-10-14 NOTE — ED PROVIDER NOTES
Patient Seen in: [facility] AND [facility] Emergency Department      History   Patient presents with:  Abdomen/Flank Pain    Stated Complaint: abd pain    46yo/f with hx of breast cancer tx with surg/rad on tamoxifen, asthma, IBS, reports with left lower quadran Smoker      Smokeless tobacco: Never Used    Alcohol use: No      Comment: rare    Drug use: No             Review of Systems   All other systems reviewed and are negative. Positive for stated complaint: abd pain  Other systems are as noted in HPI.   C subscore is 6.       Gait: Gait normal.             ED Course     Labs Reviewed   URINALYSIS WITH CULTURE REFLEX - Abnormal; Notable for the following components:       Result Value    Clarity Urine Cloudy (*)     Ketones Urine Trace (*)     Protein Urine 3 dilatation. SPLEEN:           Normal.    PANCREAS:      Normal.    ADRENALS:      Normal.   KIDNEYS:          Normal.   AORTA/VASCULAR:      Mild atherosclerotic calcification aorta. No aneurysm or dissection. LYMPHADENOPATHY: None.   GI/MESENTERY: 46yo.f w hx and exam as stated, left suprapubic pain    Difficulty controlling pain with multiple iv meds in ed  Large mass on ovary on ct, some increased echogenicity of fallopian tube, unclear significance  Patient is on tamoxifen, does have hx of

## 2020-10-14 NOTE — ED NOTES
Pt states began having LLQ pain and n/v this morning. States took some dulcolax thinking she had gas. States did not help. Denies fever. States h/o breast cancer with mastectomy last year. States had radiation and tamoxafin.

## 2020-10-14 NOTE — CONSULTS
[facility]    Report of Consultation    Lico Hayes Patient Status:  Inpatient    3/12/1975 MRN [id number]   Location [facility] 4W/SW/SE Attending No att. providers found   [address] Day # 1 PCP MD [facility]   • Visual impairment     wears glasses       Past Surgical History  Past Surgical History:   Procedure Laterality Date   • AXILLARY NODE DISSECTION Left 10/10/2019    Performed by Chanda Garcia MD at [address] [place] [address] OR   • BREAST BI MG per tab 2 tablet, 2 tablet, Oral, Q4H PRN    •  morphINE sulfate (PF) 2 MG/ML injection 1 mg, 1 mg, Intravenous, Q2H PRN    Or    •  morphINE sulfate (PF) 2 MG/ML injection 2 mg, 2 mg, Intravenous, Q2H PRN    •  ondansetron HCl (ZOFRAN) injection 4 mg, 10/13/2020    ALKPHO 48 10/13/2020    TP 7.5 10/13/2020    AST 16 10/13/2020    ALT 16 10/13/2020    INR 1.10 10/14/2020    PTP 14.0 10/14/2020    T4F 0.90 08/03/2015    TSH 1.760 07/31/2020    LIP 92 10/13/2020    MG 2.5 10/14/2020       Imaging personall thickness of 16 mm could be cyclical changes.     Dictated by (CST): Eugenio Abdalla MD on 10/13/2020 at 8:29 PM     Finalized by (CST): Eugenio Abdalla MD on 10/13/2020 at 8:37 PM                 Assessment/Plan:     Ovarian mass, left  Ultrasound consistent

## 2020-10-14 NOTE — DISCHARGE SUMMARY
General Medicine Discharge Summary     Patient ID:  Lee Bahena  54year old  3/12/1975    Admit date: 10/13/2020    Discharge date and time: 10/14/2020    Attending Physician: Elmyra Nageotte, MD     Consults: IP CONSULT TO ONCOLOGY  IP CONSULT TO OBSTETRICS for DC home and outpatient fu with GYN.       LLQ abdominal pain / hemorrhagic ovarian cyst   - CT with poss mass, vs infection, US with complex likely hemorrhagic cyst  - no fever  - GYN recommended outpatient fu     Thickened endometrium   - GYN lisandro garay neoplasm. Follow-up ultrasound suggested. 2. 5 cm ovarian mass has a mild mass effect on the urinary bladder which may account for urinary frequency. 3. Small amount of ascites. 4. Endometrial thickness of 16 mm could be cyclical changes.     Dictated by ( SELVIN Oglesby  [facility] Hospitalist  Pager

## 2020-10-14 NOTE — H&P
[facility] Hospitalist H&P       CC: Patient presents with:  Abdomen/Flank Pain       PCP: Janie Davis MD    History of Present Illness: Patient is a 54year old female with PMH sig for Depression, Breast cancer s/p bilateral mastectomy now on tamoxife Liposuction 12 years ago        ALL:    Latex                   ITCHING     Home Medications:    •  Venlafaxine HCl  MG Oral Capsule SR 24 Hr, Take 1 capsule (150 mg total) by mouth every evening., Disp: 90 capsule, Rfl: 3    •  Tamoxifen Citrate 212.0 177.0   INR  --  1.10       Recent Labs   Lab 10/13/20  1910 10/14/20  0539    140   K 3.8 4.1    111   CO2 27.0 25.0   BUN 13 9   CREATSERUM 0.90 0.72   * 104*   CA 8.8 8.0*   MG  --  2.5       Recent Labs   Lab 10/13/20  1910   A bladder which may account for urinary frequency. 3. Small amount of ascites. 4. Endometrial thickness of 16 mm could be cyclical changes.     Dictated by (CST): India Salgado MD on 10/13/2020 at 8:29 PM     Finalized by (CST): India Salgado MD on 10/13/20

## 2020-10-14 NOTE — ED NOTES
Orders for admission, patient is aware of plan and ready to go upstairs. Any questions, please call ED MIGUELANGEL Bright  at extension [phone].    Type of COVID test sent: Rapid  COVID Suspicion level: Low/High Low (negative    Titratable drug(s) infusing:  Rate:    L

## 2020-10-16 NOTE — PAYOR COMM NOTE
REQUESTING INPT 1 DAY  ADMISSION REVIEW     Payor: Saranya Joseph  #:  [id number]  Authorization Number: [id number]    Admit date: 10/13/20  Admit time: 2349       Admitting Physician: Chaitanya Victoria MD  Attending Physician:  Eden Oneal Bilateral 8/26/2020    Performed by Ezequiel Iglesias MD at [facility] OR   • BREAST RECONSTRUCTION/ IMMEDIATE/EXPANDER Bilateral 10/10/2019    Performed by Ezequiel Iglesias MD at [facility] OR   • [address] Left 10/10/2019    Performed by Eva Perera normal.      Breath sounds: Normal breath sounds. Abdominal:      General: Bowel sounds are normal.      Palpations: Abdomen is soft. Tenderness: There is abdominal tenderness in the suprapubic area and left lower quadrant.  There is no right CVA ten results for these tests on the individual orders.    RAINBOW DRAW BLUE   RAINBOW DRAW LAVENDER   RAINBOW DRAW LIGHT GREEN   RAINBOW DRAW GOLD     TECHNIQUE:    CT images of the abdomen and pelvis were obtained with non-ionic intravenous contrast material. considerations include a tubo-ovarian abscess, and cystic neoplasm. Follow-up ultrasound suggested. 2. 5 cm ovarian mass has a mild mass effect on the urinary bladder which may account for urinary frequency. 3. Small amount of ascites.   4. Endometrial t Present on Admission  Date Reviewed: 10/1/2020          ICD-10-CM Noted POA    Ovarian mass, left N83.8 10/13/2020 Unknown                   Signed by Travon Bertrand MD on 10/13/2020 11:21 PM            H&P - H&P Note      H&P signed by Brittnee Sifuentes, 10/10/2019    Performed by Jim Carranza MD at [facility] OR   • [address] Left 10/10/2019    Performed by Enoch Loaiza MD at [facility] OR   • MASTECTOMY LEFT     • MASTECTOMY RIGHT     • MASTECTOMY,SIMPLE Bilateral 10/10/19 bilaterally. Normal effort   Chest wall:  No tenderness or deformity. Heart:  Regular rate and rhythm,     Abdomen:   Soft, LLQ abd pain, bs noted, not distended   Extremities: Extremities normal, atraumatic, no cyanosis or edema.    Skin: Skin color, marlyn (CST): Xochilt Christensen MD on 10/14/2020 at 5:53 AM     Finalized by (CST): Xochilt Christensen MD on 10/14/2020 at 6:01 AM          Ct Abdomen Pelvis Iv Contrast, No Oral (er)    Result Date: 10/13/2020  CONCLUSION:  1.  Approximately 5 cm septated cystic [phone]    10/14 OB/GYN NOTE  Subjective   Interval History:   Patient feels much better today. She was admitted after she developed severe pelvic pain last night.  Received morphine in ED, ultrasound showed left ovaria cyst. Ovarian cancer markers wer correlation with saline infused sonohysterography and/or endometrial biopsy. 5. Right ovary is obscured by overlying bowel gas and cannot be assessed.     Discussed results of the ultrasound with patient.  She will follow up with me in 2 months to repeat otherwise negative.          Assessment/Plan:     Ovarian mass, left  Ultrasound consistent with a hemorrhagic cyst with likely rupture. OB/GYN note reviewed. Patient consideration oophorectomy due to her presentation.   Her sister also has history of ova [N83.8]  See Additional Discharge Diagnoses in Hospital Course    Discharged Condition: good    Follow-up with labs/images appointments:   Call OB/GYN for follow up, and if dev worsening pain  Tylenol for pain control  Return to ER if develop severe pain, 10/14/2020  CONCLUSION:  1. Left ovary again noted to be enlarged secondary to a 3.5 cm complex cyst, likely representing a hemorrhagic cyst, and a 4.0 cm simple cyst that is likely physiologic. Both cysts are likely benign given the patient's age.   [facility] Medication Changes:     Med list     Medication List      CHANGE how you take these medications    Tamoxifen Citrate 20 MG Tabs  Commonly known as: NOLVADEX  Take 1 tablet (20 mg total) by mouth daily.   What changed: when to take this        CONTINUE melissa

## 2020-11-03 PROCEDURE — 88305 TISSUE EXAM BY PATHOLOGIST: CPT | Performed by: OBSTETRICS & GYNECOLOGY

## 2020-11-20 ENCOUNTER — OFFICE VISIT (OUTPATIENT)
Dept: SURGERY | Facility: CLINIC | Age: 45
End: 2020-11-20
Payer: COMMERCIAL

## 2020-11-20 PROCEDURE — 99024 POSTOP FOLLOW-UP VISIT: CPT | Performed by: SURGERY

## 2020-11-20 NOTE — PATIENT INSTRUCTIONS
Surgeon: Dr. Talita Mejias      Tel:  [phone]    Fax: [phone]     Surgery/Procedure: Bilateral implant exchange with autologous fat grafting. General anesthesia, outpatient, 2.5 hours.        Hospital:  [facility]: [address]

## 2020-11-25 ENCOUNTER — TELEPHONE (OUTPATIENT)
Dept: SURGERY | Facility: CLINIC | Age: 45
End: 2020-11-25

## 2020-11-25 DIAGNOSIS — Z90.13 ABSENCE OF BREAST, BILATERAL: Primary | ICD-10-CM

## 2020-11-25 NOTE — TELEPHONE ENCOUNTER
I called the patient and scheduled her procedure with Dr Anju Oreilly at [facility] on 04/29/2021.  Confirmed date and location

## 2021-01-04 ENCOUNTER — OFFICE VISIT (OUTPATIENT)
Dept: HEMATOLOGY/ONCOLOGY | Facility: HOSPITAL | Age: 46
End: 2021-01-04
Attending: INTERNAL MEDICINE
Payer: COMMERCIAL

## 2021-01-04 VITALS
TEMPERATURE: 98 F | DIASTOLIC BLOOD PRESSURE: 70 MMHG | BODY MASS INDEX: 23.16 KG/M2 | OXYGEN SATURATION: 100 % | WEIGHT: 139 LBS | SYSTOLIC BLOOD PRESSURE: 129 MMHG | RESPIRATION RATE: 16 BRPM | HEIGHT: 65 IN | HEART RATE: 73 BPM

## 2021-01-04 DIAGNOSIS — Z17.0 MALIGNANT NEOPLASM OF OVERLAPPING SITES OF LEFT BREAST IN FEMALE, ESTROGEN RECEPTOR POSITIVE (HCC): Primary | ICD-10-CM

## 2021-01-04 DIAGNOSIS — Z79.810 LONG-TERM CURRENT USE OF TAMOXIFEN: ICD-10-CM

## 2021-01-04 DIAGNOSIS — N83.8 OVARIAN MASS, LEFT: ICD-10-CM

## 2021-01-04 DIAGNOSIS — C50.812 MALIGNANT NEOPLASM OF OVERLAPPING SITES OF LEFT BREAST IN FEMALE, ESTROGEN RECEPTOR POSITIVE (HCC): Primary | ICD-10-CM

## 2021-01-04 DIAGNOSIS — R23.2 HOT FLASHES: ICD-10-CM

## 2021-01-04 PROCEDURE — 99214 OFFICE O/P EST MOD 30 MIN: CPT | Performed by: INTERNAL MEDICINE

## 2021-01-04 NOTE — PROGRESS NOTES
Cancer Center Progress Note    Patient Name: Sergio Luis   YOB: 1975   Medical Record Number: [id number]   Attending Physician: Jerri Perez M.D.      Chief Complaint:  Breast Cancer    Oncology History:   August 30, 2019: Mammo: nodularity Tre Lindsey MD at [facility] OR   • BREAST BIOPSY Left 09/04/2019   • BREAST MASTECTOMY WITH PLASTIC SURGEON RECONSTRUCTION Bilateral 10/10/2019    Performed by Merlene Brown MD at [facility] OR   • BREAST RECONSTRUCTION SECOND STAGE/ REVISION Bilateral 8/ palpable lymphadenopathy. Neck is supple. Lymphatics: There is no palpable peripheral lymphadenopathy   Chest: Symmetric expansion, nonlabored breathing  Breast: bilateral mastectomies, no lesions.  Inferior fold of the right breast has mild erythema prese medically and if tolerating, she wants to still consider surgical approach given her ovarian issues. - for now, continue berry and return for lupron when approved    Ovarian cyst  - s/p hospitalization for ruptured cyst.   - doing better.    - Will try lup

## 2021-01-05 ENCOUNTER — TELEPHONE (OUTPATIENT)
Dept: HEMATOLOGY/ONCOLOGY | Facility: HOSPITAL | Age: 46
End: 2021-01-05

## 2021-01-07 ENCOUNTER — NURSE ONLY (OUTPATIENT)
Dept: HEMATOLOGY/ONCOLOGY | Facility: HOSPITAL | Age: 46
End: 2021-01-07
Attending: INTERNAL MEDICINE
Payer: COMMERCIAL

## 2021-01-07 VITALS
SYSTOLIC BLOOD PRESSURE: 115 MMHG | HEART RATE: 72 BPM | TEMPERATURE: 98 F | DIASTOLIC BLOOD PRESSURE: 82 MMHG | RESPIRATION RATE: 16 BRPM

## 2021-01-07 DIAGNOSIS — Z17.0 MALIGNANT NEOPLASM OF OVERLAPPING SITES OF LEFT BREAST IN FEMALE, ESTROGEN RECEPTOR POSITIVE (HCC): Primary | ICD-10-CM

## 2021-01-07 DIAGNOSIS — C50.812 MALIGNANT NEOPLASM OF OVERLAPPING SITES OF LEFT BREAST IN FEMALE, ESTROGEN RECEPTOR POSITIVE (HCC): Primary | ICD-10-CM

## 2021-01-07 DIAGNOSIS — N83.8 OVARIAN MASS, LEFT: ICD-10-CM

## 2021-01-07 PROCEDURE — 96402 CHEMO HORMON ANTINEOPL SQ/IM: CPT

## 2021-01-07 NOTE — PROGRESS NOTES
Pt to infusion for q3m Lupron. Arrived ambulating independently. Discussed Lupron injection and possible s/e with patient, who verbalized understanding. Injection given in R ventrogluteal site and tolerated well.  Discharged ambulatory with next MD and sandra

## 2021-03-01 ENCOUNTER — TELEPHONE (OUTPATIENT)
Dept: SURGERY | Facility: CLINIC | Age: 46
End: 2021-03-01

## 2021-03-01 NOTE — TELEPHONE ENCOUNTER
DEREKM for pt letting her know that I received her new insurance info and I will be working out getting her authorization for her procedure with Dr. Kel Arita.  I let her know that if there is an issue I will reach out to her and if she has any questions for me s

## 2021-03-09 DIAGNOSIS — Z90.13 ABSENCE OF BREAST, ACQUIRED, BILATERAL: Primary | ICD-10-CM

## 2021-03-12 DIAGNOSIS — Z23 NEED FOR VACCINATION: ICD-10-CM

## 2021-03-16 ENCOUNTER — IMMUNIZATION (OUTPATIENT)
Dept: LAB | Facility: HOSPITAL | Age: 46
End: 2021-03-16
Attending: HOSPITALIST
Payer: COMMERCIAL

## 2021-03-16 DIAGNOSIS — Z23 NEED FOR VACCINATION: Primary | ICD-10-CM

## 2021-03-16 PROCEDURE — 0011A SARSCOV2 VAC 100MCG/0.5ML IM: CPT

## 2021-04-05 ENCOUNTER — APPOINTMENT (OUTPATIENT)
Dept: HEMATOLOGY/ONCOLOGY | Facility: HOSPITAL | Age: 46
End: 2021-04-05
Attending: INTERNAL MEDICINE
Payer: COMMERCIAL

## 2021-04-06 ENCOUNTER — OFFICE VISIT (OUTPATIENT)
Dept: HEMATOLOGY/ONCOLOGY | Facility: HOSPITAL | Age: 46
End: 2021-04-06
Attending: INTERNAL MEDICINE
Payer: COMMERCIAL

## 2021-04-06 VITALS
HEIGHT: 65 IN | SYSTOLIC BLOOD PRESSURE: 122 MMHG | DIASTOLIC BLOOD PRESSURE: 76 MMHG | TEMPERATURE: 98 F | HEART RATE: 77 BPM | OXYGEN SATURATION: 100 % | WEIGHT: 146 LBS | BODY MASS INDEX: 24.32 KG/M2 | RESPIRATION RATE: 16 BRPM

## 2021-04-06 DIAGNOSIS — Z17.0 MALIGNANT NEOPLASM OF OVERLAPPING SITES OF LEFT BREAST IN FEMALE, ESTROGEN RECEPTOR POSITIVE (HCC): Primary | ICD-10-CM

## 2021-04-06 DIAGNOSIS — R23.2 HOT FLASHES: ICD-10-CM

## 2021-04-06 DIAGNOSIS — Z79.810 LONG-TERM CURRENT USE OF TAMOXIFEN: ICD-10-CM

## 2021-04-06 DIAGNOSIS — N83.8 OVARIAN MASS, LEFT: ICD-10-CM

## 2021-04-06 DIAGNOSIS — C50.812 MALIGNANT NEOPLASM OF OVERLAPPING SITES OF LEFT BREAST IN FEMALE, ESTROGEN RECEPTOR POSITIVE (HCC): Primary | ICD-10-CM

## 2021-04-06 DIAGNOSIS — D05.01 NEOPLASM OF RIGHT BREAST, PRIMARY TUMOR STAGING CATEGORY TIS: LOBULAR CARCINOMA IN SITU (LCIS): ICD-10-CM

## 2021-04-06 DIAGNOSIS — M85.80 OSTEOPENIA DUE TO CANCER THERAPY: Primary | ICD-10-CM

## 2021-04-06 PROBLEM — R10.9 INTRACTABLE ABDOMINAL PAIN: Status: RESOLVED | Noted: 2020-10-13 | Resolved: 2021-04-06

## 2021-04-06 PROCEDURE — 99215 OFFICE O/P EST HI 40 MIN: CPT | Performed by: INTERNAL MEDICINE

## 2021-04-06 PROCEDURE — 96402 CHEMO HORMON ANTINEOPL SQ/IM: CPT

## 2021-04-06 RX ORDER — VENLAFAXINE HYDROCHLORIDE 150 MG/1
150 CAPSULE, EXTENDED RELEASE ORAL DAILY
Qty: 90 CAPSULE | Refills: 0 | Status: SHIPPED | OUTPATIENT
Start: 2021-04-06 | End: 2021-07-06

## 2021-04-06 RX ORDER — VENLAFAXINE HYDROCHLORIDE 150 MG/1
CAPSULE, EXTENDED RELEASE ORAL
COMMUNITY
Start: 2021-03-10 | End: 2021-04-06

## 2021-04-06 NOTE — PROGRESS NOTES
Pt to infusion for q3m Lupron. Arrived ambulating independently. Injection given in R ventrogluteal site and tolerated well. Discharged ambulatory with next MD and injection appointment scheduled.

## 2021-04-06 NOTE — PROGRESS NOTES
Cancer Center Progress Note    Patient Name: Maximo Romero   YOB: 1975   Medical Record Number: [id number]   Attending Physician: Corrina Hatch M.D.      Chief Complaint:  Breast Cancer    Oncology History:   August 30, 2019: Mammo: nodularity • AXILLARY NODE DISSECTION Left 10/10/2019    Performed by Laurie Mercer MD at [facility] MAIN OR   • BREAST BIOPSY Left 09/04/2019   • BREAST MASTECTOMY WITH PLASTIC SURGEON RECONSTRUCTION Bilateral 10/10/2019    Performed by Laurie Mercer MD at [facility] Examination:  General: Patient is alert and oriented x 3, not in acute distress. HEENT: EOMs intact. PERRL. Oropharynx is clear. Neck: . Neck is supple. Lymphatics:  There is no palpable peripheral lymphadenopathy   Chest: Symmetric expansion, nonlabore thickening   - s/p hospitalization for ruptured cyst.   - doing better. Recommend f/u with OB  - s/p benign bx. Continue close gyn eval.     3. Depression/anxiety  4.  Hot flash  - Better on effexor at 150 mg    MDM: Tramaine Arana MD

## 2021-04-15 ENCOUNTER — IMMUNIZATION (OUTPATIENT)
Dept: LAB | Facility: HOSPITAL | Age: 46
End: 2021-04-15
Attending: EMERGENCY MEDICINE
Payer: COMMERCIAL

## 2021-04-15 DIAGNOSIS — Z23 NEED FOR VACCINATION: Primary | ICD-10-CM

## 2021-04-15 PROCEDURE — 0012A SARSCOV2 VAC 100MCG/0.5ML IM: CPT

## 2021-04-23 ENCOUNTER — VIRTUAL PHONE E/M (OUTPATIENT)
Dept: SURGERY | Facility: CLINIC | Age: 46
End: 2021-04-23
Payer: COMMERCIAL

## 2021-04-23 DIAGNOSIS — Z90.13 ABSENCE OF BOTH BREASTS: Primary | ICD-10-CM

## 2021-04-23 PROCEDURE — 99212 OFFICE O/P EST SF 10 MIN: CPT | Performed by: SURGERY

## 2021-04-23 NOTE — PROGRESS NOTES
Spoke to patient. Answered multiple questions about upcoming revision surgery. The patient is concerned about the risk of anterior/posterior malposition and wishes to proceed with exchange of a larger soft touch implant with concomitant fat grafting.

## 2021-04-26 ENCOUNTER — TELEPHONE (OUTPATIENT)
Dept: SURGERY | Facility: CLINIC | Age: 46
End: 2021-04-26

## 2021-04-26 NOTE — TELEPHONE ENCOUNTER
Patient was called and LVM to see if she has completed her pre-op testing. ASked patient to return my call.

## 2021-04-26 NOTE — TELEPHONE ENCOUNTER
Patient called back and stated that she didn't know she need to do that. Patient will call her PCP to see if she can get in to see him.

## 2021-04-27 ENCOUNTER — LAB ENCOUNTER (OUTPATIENT)
Dept: LAB | Age: 46
End: 2021-04-27
Attending: SURGERY
Payer: COMMERCIAL

## 2021-04-27 ENCOUNTER — NURSE ONLY (OUTPATIENT)
Dept: LAB | Age: 46
End: 2021-04-27
Attending: SURGERY
Payer: COMMERCIAL

## 2021-04-27 DIAGNOSIS — Z17.0 MALIGNANT NEOPLASM OF OVERLAPPING SITES OF LEFT BREAST IN FEMALE, ESTROGEN RECEPTOR POSITIVE (HCC): ICD-10-CM

## 2021-04-27 DIAGNOSIS — Z01.818 PREOPERATIVE TESTING: ICD-10-CM

## 2021-04-27 DIAGNOSIS — C50.812 MALIGNANT NEOPLASM OF OVERLAPPING SITES OF LEFT BREAST IN FEMALE, ESTROGEN RECEPTOR POSITIVE (HCC): ICD-10-CM

## 2021-04-27 PROCEDURE — 36415 COLL VENOUS BLD VENIPUNCTURE: CPT

## 2021-04-27 PROCEDURE — 85025 COMPLETE CBC W/AUTO DIFF WBC: CPT

## 2021-04-27 PROCEDURE — 80053 COMPREHEN METABOLIC PANEL: CPT

## 2021-04-27 PROCEDURE — 93005 ELECTROCARDIOGRAM TRACING: CPT

## 2021-04-27 PROCEDURE — 93010 ELECTROCARDIOGRAM REPORT: CPT | Performed by: SURGERY

## 2021-04-28 ENCOUNTER — ANESTHESIA EVENT (OUTPATIENT)
Dept: SURGERY | Facility: HOSPITAL | Age: 46
End: 2021-04-28
Payer: COMMERCIAL

## 2021-04-29 ENCOUNTER — HOSPITAL ENCOUNTER (OUTPATIENT)
Facility: HOSPITAL | Age: 46
Setting detail: HOSPITAL OUTPATIENT SURGERY
Discharge: HOME | End: 2021-04-29
Attending: SURGERY | Admitting: SURGERY
Payer: COMMERCIAL

## 2021-04-29 ENCOUNTER — ANESTHESIA (OUTPATIENT)
Dept: SURGERY | Facility: HOSPITAL | Age: 46
End: 2021-04-29
Payer: COMMERCIAL

## 2021-04-29 VITALS
SYSTOLIC BLOOD PRESSURE: 105 MMHG | TEMPERATURE: 98 F | RESPIRATION RATE: 18 BRPM | HEIGHT: 65 IN | BODY MASS INDEX: 23.66 KG/M2 | HEART RATE: 94 BPM | OXYGEN SATURATION: 96 % | DIASTOLIC BLOOD PRESSURE: 67 MMHG | WEIGHT: 142 LBS

## 2021-04-29 DIAGNOSIS — Z01.818 PREOPERATIVE TESTING: Primary | ICD-10-CM

## 2021-04-29 DIAGNOSIS — Z90.13 ABSENCE OF BREAST, BILATERAL: ICD-10-CM

## 2021-04-29 PROCEDURE — 0HPU0JZ REMOVAL OF SYNTHETIC SUBSTITUTE FROM LEFT BREAST, OPEN APPROACH: ICD-10-PCS | Performed by: SURGERY

## 2021-04-29 PROCEDURE — 81025 URINE PREGNANCY TEST: CPT

## 2021-04-29 PROCEDURE — 0JDM3ZZ EXTRACTION OF LEFT UPPER LEG SUBCUTANEOUS TISSUE AND FASCIA, PERCUTANEOUS APPROACH: ICD-10-PCS | Performed by: SURGERY

## 2021-04-29 PROCEDURE — 0HRV0JZ REPLACEMENT OF BILATERAL BREAST WITH SYNTHETIC SUBSTITUTE, OPEN APPROACH: ICD-10-PCS | Performed by: SURGERY

## 2021-04-29 PROCEDURE — 0HRV37Z REPLACEMENT OF BILATERAL BREAST WITH AUTOLOGOUS TISSUE SUBSTITUTE, PERCUTANEOUS APPROACH: ICD-10-PCS | Performed by: SURGERY

## 2021-04-29 PROCEDURE — 88300 SURGICAL PATH GROSS: CPT | Performed by: SURGERY

## 2021-04-29 PROCEDURE — 0JDL3ZZ EXTRACTION OF RIGHT UPPER LEG SUBCUTANEOUS TISSUE AND FASCIA, PERCUTANEOUS APPROACH: ICD-10-PCS | Performed by: SURGERY

## 2021-04-29 PROCEDURE — 0HPT0JZ REMOVAL OF SYNTHETIC SUBSTITUTE FROM RIGHT BREAST, OPEN APPROACH: ICD-10-PCS | Performed by: SURGERY

## 2021-04-29 RX ORDER — GLYCOPYRROLATE 0.2 MG/ML
INJECTION, SOLUTION INTRAMUSCULAR; INTRAVENOUS AS NEEDED
Status: DISCONTINUED | OUTPATIENT
Start: 2021-04-29 | End: 2021-04-29 | Stop reason: SURG

## 2021-04-29 RX ORDER — MORPHINE SULFATE 4 MG/ML
4 INJECTION, SOLUTION INTRAMUSCULAR; INTRAVENOUS EVERY 10 MIN PRN
Status: DISCONTINUED | OUTPATIENT
Start: 2021-04-29 | End: 2021-04-29

## 2021-04-29 RX ORDER — PROCHLORPERAZINE EDISYLATE 5 MG/ML
5 INJECTION INTRAMUSCULAR; INTRAVENOUS ONCE AS NEEDED
Status: DISCONTINUED | OUTPATIENT
Start: 2021-04-29 | End: 2021-04-29

## 2021-04-29 RX ORDER — DOCUSATE SODIUM 100 MG/1
100 CAPSULE, LIQUID FILLED ORAL 2 TIMES DAILY
Qty: 30 CAPSULE | Refills: 1 | Status: SHIPPED | OUTPATIENT
Start: 2021-04-29 | End: 2021-05-21 | Stop reason: ALTCHOICE

## 2021-04-29 RX ORDER — ROCURONIUM BROMIDE 10 MG/ML
INJECTION, SOLUTION INTRAVENOUS AS NEEDED
Status: DISCONTINUED | OUTPATIENT
Start: 2021-04-29 | End: 2021-04-29 | Stop reason: SURG

## 2021-04-29 RX ORDER — CEFAZOLIN SODIUM/WATER 2 G/20 ML
2 SYRINGE (ML) INTRAVENOUS ONCE
Status: COMPLETED | OUTPATIENT
Start: 2021-04-29 | End: 2021-04-29

## 2021-04-29 RX ORDER — NEOSTIGMINE METHYLSULFATE 1 MG/ML
INJECTION INTRAVENOUS AS NEEDED
Status: DISCONTINUED | OUTPATIENT
Start: 2021-04-29 | End: 2021-04-29 | Stop reason: SURG

## 2021-04-29 RX ORDER — HALOPERIDOL 5 MG/ML
0.25 INJECTION INTRAMUSCULAR ONCE AS NEEDED
Status: DISCONTINUED | OUTPATIENT
Start: 2021-04-29 | End: 2021-04-29

## 2021-04-29 RX ORDER — HYDROMORPHONE HYDROCHLORIDE 1 MG/ML
0.2 INJECTION, SOLUTION INTRAMUSCULAR; INTRAVENOUS; SUBCUTANEOUS EVERY 5 MIN PRN
Status: DISCONTINUED | OUTPATIENT
Start: 2021-04-29 | End: 2021-04-29

## 2021-04-29 RX ORDER — EPHEDRINE SULFATE 50 MG/ML
INJECTION, SOLUTION INTRAVENOUS AS NEEDED
Status: DISCONTINUED | OUTPATIENT
Start: 2021-04-29 | End: 2021-04-29 | Stop reason: SURG

## 2021-04-29 RX ORDER — DEXAMETHASONE SODIUM PHOSPHATE 4 MG/ML
VIAL (ML) INJECTION AS NEEDED
Status: DISCONTINUED | OUTPATIENT
Start: 2021-04-29 | End: 2021-04-29 | Stop reason: SURG

## 2021-04-29 RX ORDER — HYDROCODONE BITARTRATE AND ACETAMINOPHEN 5; 325 MG/1; MG/1
2 TABLET ORAL AS NEEDED
Status: DISCONTINUED | OUTPATIENT
Start: 2021-04-29 | End: 2021-04-29

## 2021-04-29 RX ORDER — ONDANSETRON 2 MG/ML
4 INJECTION INTRAMUSCULAR; INTRAVENOUS ONCE AS NEEDED
Status: DISCONTINUED | OUTPATIENT
Start: 2021-04-29 | End: 2021-04-29

## 2021-04-29 RX ORDER — ACETAMINOPHEN 650 MG
TABLET, EXTENDED RELEASE ORAL AS NEEDED
Status: DISCONTINUED | OUTPATIENT
Start: 2021-04-29 | End: 2021-04-29 | Stop reason: HOSPADM

## 2021-04-29 RX ORDER — ACETAMINOPHEN 500 MG
1000 TABLET ORAL ONCE
Status: COMPLETED | OUTPATIENT
Start: 2021-04-29 | End: 2021-04-29

## 2021-04-29 RX ORDER — MEPERIDINE HYDROCHLORIDE 25 MG/ML
25 INJECTION INTRAMUSCULAR; INTRAVENOUS; SUBCUTANEOUS ONCE
Status: COMPLETED | OUTPATIENT
Start: 2021-04-29 | End: 2021-04-29

## 2021-04-29 RX ORDER — MORPHINE SULFATE 10 MG/ML
6 INJECTION, SOLUTION INTRAMUSCULAR; INTRAVENOUS EVERY 10 MIN PRN
Status: DISCONTINUED | OUTPATIENT
Start: 2021-04-29 | End: 2021-04-29

## 2021-04-29 RX ORDER — MIDAZOLAM HYDROCHLORIDE 1 MG/ML
INJECTION INTRAMUSCULAR; INTRAVENOUS AS NEEDED
Status: DISCONTINUED | OUTPATIENT
Start: 2021-04-29 | End: 2021-04-29 | Stop reason: SURG

## 2021-04-29 RX ORDER — HYDROCODONE BITARTRATE AND ACETAMINOPHEN 5; 325 MG/1; MG/1
1 TABLET ORAL AS NEEDED
Status: DISCONTINUED | OUTPATIENT
Start: 2021-04-29 | End: 2021-04-29

## 2021-04-29 RX ORDER — MORPHINE SULFATE 4 MG/ML
2 INJECTION, SOLUTION INTRAMUSCULAR; INTRAVENOUS EVERY 10 MIN PRN
Status: DISCONTINUED | OUTPATIENT
Start: 2021-04-29 | End: 2021-04-29

## 2021-04-29 RX ORDER — CEPHALEXIN 500 MG/1
500 CAPSULE ORAL 4 TIMES DAILY
Qty: 20 CAPSULE | Refills: 0 | Status: SHIPPED | OUTPATIENT
Start: 2021-04-29 | End: 2021-05-21 | Stop reason: ALTCHOICE

## 2021-04-29 RX ORDER — HYDROCODONE BITARTRATE AND ACETAMINOPHEN 5; 325 MG/1; MG/1
1-2 TABLET ORAL EVERY 4 HOURS PRN
Qty: 20 TABLET | Refills: 0 | Status: SHIPPED | OUTPATIENT
Start: 2021-04-29 | End: 2021-05-21 | Stop reason: ALTCHOICE

## 2021-04-29 RX ORDER — SODIUM CHLORIDE, SODIUM LACTATE, POTASSIUM CHLORIDE, CALCIUM CHLORIDE 600; 310; 30; 20 MG/100ML; MG/100ML; MG/100ML; MG/100ML
INJECTION, SOLUTION INTRAVENOUS CONTINUOUS
Status: DISCONTINUED | OUTPATIENT
Start: 2021-04-29 | End: 2021-04-29

## 2021-04-29 RX ORDER — LIDOCAINE HYDROCHLORIDE 10 MG/ML
INJECTION, SOLUTION EPIDURAL; INFILTRATION; INTRACAUDAL; PERINEURAL AS NEEDED
Status: DISCONTINUED | OUTPATIENT
Start: 2021-04-29 | End: 2021-04-29 | Stop reason: SURG

## 2021-04-29 RX ORDER — HYDROMORPHONE HYDROCHLORIDE 1 MG/ML
0.6 INJECTION, SOLUTION INTRAMUSCULAR; INTRAVENOUS; SUBCUTANEOUS EVERY 5 MIN PRN
Status: DISCONTINUED | OUTPATIENT
Start: 2021-04-29 | End: 2021-04-29

## 2021-04-29 RX ORDER — HYDROMORPHONE HYDROCHLORIDE 1 MG/ML
0.4 INJECTION, SOLUTION INTRAMUSCULAR; INTRAVENOUS; SUBCUTANEOUS EVERY 5 MIN PRN
Status: DISCONTINUED | OUTPATIENT
Start: 2021-04-29 | End: 2021-04-29

## 2021-04-29 RX ORDER — LIDOCAINE HYDROCHLORIDE AND EPINEPHRINE 10; 10 MG/ML; UG/ML
INJECTION, SOLUTION INFILTRATION; PERINEURAL AS NEEDED
Status: DISCONTINUED | OUTPATIENT
Start: 2021-04-29 | End: 2021-04-29 | Stop reason: HOSPADM

## 2021-04-29 RX ORDER — ONDANSETRON 2 MG/ML
INJECTION INTRAMUSCULAR; INTRAVENOUS AS NEEDED
Status: DISCONTINUED | OUTPATIENT
Start: 2021-04-29 | End: 2021-04-29 | Stop reason: SURG

## 2021-04-29 RX ORDER — NALOXONE HYDROCHLORIDE 0.4 MG/ML
80 INJECTION, SOLUTION INTRAMUSCULAR; INTRAVENOUS; SUBCUTANEOUS AS NEEDED
Status: DISCONTINUED | OUTPATIENT
Start: 2021-04-29 | End: 2021-04-29

## 2021-04-29 RX ADMIN — DEXAMETHASONE SODIUM PHOSPHATE 4 MG: 4 MG/ML VIAL (ML) INJECTION at 07:46:00

## 2021-04-29 RX ADMIN — SODIUM CHLORIDE, SODIUM LACTATE, POTASSIUM CHLORIDE, CALCIUM CHLORIDE: 600; 310; 30; 20 INJECTION, SOLUTION INTRAVENOUS at 10:16:00

## 2021-04-29 RX ADMIN — ROCURONIUM BROMIDE 40 MG: 10 INJECTION, SOLUTION INTRAVENOUS at 07:33:00

## 2021-04-29 RX ADMIN — LIDOCAINE HYDROCHLORIDE 50 MG: 10 INJECTION, SOLUTION EPIDURAL; INFILTRATION; INTRACAUDAL; PERINEURAL at 07:32:00

## 2021-04-29 RX ADMIN — SODIUM CHLORIDE, SODIUM LACTATE, POTASSIUM CHLORIDE, CALCIUM CHLORIDE: 600; 310; 30; 20 INJECTION, SOLUTION INTRAVENOUS at 08:58:00

## 2021-04-29 RX ADMIN — SODIUM CHLORIDE, SODIUM LACTATE, POTASSIUM CHLORIDE, CALCIUM CHLORIDE: 600; 310; 30; 20 INJECTION, SOLUTION INTRAVENOUS at 07:26:00

## 2021-04-29 RX ADMIN — EPHEDRINE SULFATE 10 MG: 50 INJECTION, SOLUTION INTRAVENOUS at 08:32:00

## 2021-04-29 RX ADMIN — ONDANSETRON 4 MG: 2 INJECTION INTRAMUSCULAR; INTRAVENOUS at 09:55:00

## 2021-04-29 RX ADMIN — NEOSTIGMINE METHYLSULFATE 2 MG: 1 INJECTION INTRAVENOUS at 10:10:00

## 2021-04-29 RX ADMIN — EPHEDRINE SULFATE 10 MG: 50 INJECTION, SOLUTION INTRAVENOUS at 08:25:00

## 2021-04-29 RX ADMIN — MIDAZOLAM HYDROCHLORIDE 2 MG: 1 INJECTION INTRAMUSCULAR; INTRAVENOUS at 07:26:00

## 2021-04-29 RX ADMIN — GLYCOPYRROLATE 0.4 MG: 0.2 INJECTION, SOLUTION INTRAMUSCULAR; INTRAVENOUS at 10:10:00

## 2021-04-29 RX ADMIN — EPHEDRINE SULFATE 5 MG: 50 INJECTION, SOLUTION INTRAVENOUS at 08:20:00

## 2021-04-29 RX ADMIN — CEFAZOLIN SODIUM/WATER 2 G: 2 G/20 ML SYRINGE (ML) INTRAVENOUS at 07:38:00

## 2021-04-29 RX ADMIN — SODIUM CHLORIDE, SODIUM LACTATE, POTASSIUM CHLORIDE, CALCIUM CHLORIDE: 600; 310; 30; 20 INJECTION, SOLUTION INTRAVENOUS at 10:27:00

## 2021-04-29 NOTE — BRIEF OP NOTE
Pre-Operative Diagnosis: Absence of breast, bilateral [Z90.13]     Post-Operative Diagnosis: Absence of breast, bilateral [Z90.13]      Procedure Performed:    Bilateral implant exchange with autologous fat grafting    Surgeon(s) and Role:     * Reese Jansen

## 2021-04-29 NOTE — ANESTHESIA PROCEDURE NOTES
Airway  Date/Time: 4/29/2021 7:35 AM  Urgency: Elective    Airway not difficult    General Information and Staff    Patient location during procedure: OR  Anesthesiologist: Alysia Lee MD  Resident/CRNA: Salas Rice CRNA  Performed: STEVE

## 2021-04-29 NOTE — H&P
No change in H&P from 4/27. We discussed the plan for implant exchange to a larger implant and fat grafting.   We discussed the risks of surgery including but not limited to bleeding, infection, scarring, delayed wound healing, asymmetry, implant infection

## 2021-04-29 NOTE — OPERATIVE REPORT
[facility]    PATIENT'S NAME: Lazarus Sprinkle   ATTENDING PHYSICIAN: Enrique Prado MD   OPERATING PHYSICIAN: Enrique Prado MD   PATIENT ACCOUNT#:   [id number]    LOCATION:  [facility] 10 [facility] 10  MEDICAL RECORD #:   [id number]       DATE OF patient then underwent successful induction of general anesthesia and endotracheal intubation. The arms were placed abducted on padded arm boards and loosely secured with Kerlix. The chest, abdomen, and thighs were prepped and draped sterilely.   The prop left areolar border, the fat was grafted to bilateral breasts with the patient upright position and sizers in place.   Then, 39 mL was grafted to the left breast and 10 mL was grafted to the right breast.  Next, using electrocautery, both superior capsules

## 2021-04-29 NOTE — ANESTHESIA PREPROCEDURE EVALUATION
Anesthesia PreOp Note    HPI:     Isabel Longo is a 51year old female who presents for preoperative consultation requested by: Latonia Lopez MD    Date of Surgery: 4/29/2021    Procedure(s):  Bilateral implant exchange with autologous fat grafting  FAT HISTORY      Liposuction 12 years ago       Venlafaxine HCl  MG Oral Capsule SR 24 Hr, Take 1 capsule (150 mg total) by mouth daily.  (Patient taking differently: Take 150 mg by mouth nightly.  ), Disp: 90 capsule, Rfl: 0, 4/26/2021  Tamoxifen Citrate Difficulty of Paying Living Expenses:   Food Insecurity:       Worried About [address] in the Last Year:       Ran Out of Food in the Last Year:   Transportation Needs:       Lack of Transportation (Medical):       Lack of Transportation (Non-Med FB  Neck ROM: full  Dental - normal exam     Pulmonary - normal exam   (+) asthma (allergy induced),   Cardiovascular - negative ROS and normal exam  Exercise tolerance: good    Neuro/Psych    (+)  depression,       GI/Hepatic/Renal - negative ROS     Endo

## 2021-04-29 NOTE — ANESTHESIA POSTPROCEDURE EVALUATION
Patient:  [facility] [place]    Procedure Summary     Date: 04/29/21 Room / Location: [address] [place] [address] MAIN OR 01 / 300 [facility][note 2] MAIN OR    Anesthesia Start: 0362 Anesthesia Stop: 5096    Procedures:       Bilateral implant exchange with autologous fat grafting (Bilateral Breast)

## 2021-05-07 ENCOUNTER — OFFICE VISIT (OUTPATIENT)
Dept: SURGERY | Facility: CLINIC | Age: 46
End: 2021-05-07
Payer: COMMERCIAL

## 2021-05-07 DIAGNOSIS — Z90.13 ABSENCE OF BREAST, BILATERAL: Primary | ICD-10-CM

## 2021-05-07 PROCEDURE — 99024 POSTOP FOLLOW-UP VISIT: CPT | Performed by: PHYSICIAN ASSISTANT

## 2021-05-07 NOTE — PROGRESS NOTES
Lalo Troy is a 51year old female who presents today for a follow-up after bilateral implant exchange, right breast capsulorrhaphy, autologous fat grafting to bilateral reconstructed breasts on 4/29/2021. She denies fever and chills.  She denies nausea

## 2021-05-15 ENCOUNTER — HOSPITAL ENCOUNTER (OUTPATIENT)
Dept: BONE DENSITY | Age: 46
Discharge: HOME | End: 2021-05-15
Attending: INTERNAL MEDICINE
Payer: COMMERCIAL

## 2021-05-15 DIAGNOSIS — M85.80 OSTEOPENIA DUE TO CANCER THERAPY: ICD-10-CM

## 2021-05-15 PROCEDURE — 77080 DXA BONE DENSITY AXIAL: CPT | Performed by: INTERNAL MEDICINE

## 2021-05-21 ENCOUNTER — OFFICE VISIT (OUTPATIENT)
Dept: SURGERY | Facility: CLINIC | Age: 46
End: 2021-05-21
Payer: COMMERCIAL

## 2021-05-21 DIAGNOSIS — Z90.13 ABSENCE OF BREAST, BILATERAL: Primary | ICD-10-CM

## 2021-05-21 PROCEDURE — 99024 POSTOP FOLLOW-UP VISIT: CPT | Performed by: SURGERY

## 2021-05-21 NOTE — PROGRESS NOTES
Sahara Fonseca is a 51year old female who presents today for a follow-up. She denies fever and chills. She denies nausea, vomiting, diarrhea or constipation. Physical Examination:  Breasts: Breasts show good shape and symmetry.   Steri-Strips were re

## 2021-06-29 ENCOUNTER — OFFICE VISIT (OUTPATIENT)
Dept: HEMATOLOGY/ONCOLOGY | Facility: HOSPITAL | Age: 46
End: 2021-06-29
Attending: INTERNAL MEDICINE
Payer: COMMERCIAL

## 2021-06-29 ENCOUNTER — OFFICE VISIT (OUTPATIENT)
Dept: PHYSICAL THERAPY | Facility: HOSPITAL | Age: 46
End: 2021-06-29
Attending: INTERNAL MEDICINE
Payer: COMMERCIAL

## 2021-06-29 VITALS
RESPIRATION RATE: 16 BRPM | SYSTOLIC BLOOD PRESSURE: 104 MMHG | OXYGEN SATURATION: 98 % | WEIGHT: 146 LBS | DIASTOLIC BLOOD PRESSURE: 67 MMHG | TEMPERATURE: 98 F | HEART RATE: 60 BPM | HEIGHT: 65 IN | BODY MASS INDEX: 24.32 KG/M2

## 2021-06-29 DIAGNOSIS — Z79.810 LONG-TERM CURRENT USE OF TAMOXIFEN: ICD-10-CM

## 2021-06-29 DIAGNOSIS — Z17.0 MALIGNANT NEOPLASM OF OVERLAPPING SITES OF LEFT BREAST IN FEMALE, ESTROGEN RECEPTOR POSITIVE (HCC): Primary | ICD-10-CM

## 2021-06-29 DIAGNOSIS — C50.812 MALIGNANT NEOPLASM OF OVERLAPPING SITES OF LEFT BREAST IN FEMALE, ESTROGEN RECEPTOR POSITIVE (HCC): Primary | ICD-10-CM

## 2021-06-29 DIAGNOSIS — R23.2 HOT FLASHES: ICD-10-CM

## 2021-06-29 DIAGNOSIS — N83.8 OVARIAN MASS, LEFT: ICD-10-CM

## 2021-06-29 DIAGNOSIS — D05.01 NEOPLASM OF RIGHT BREAST, PRIMARY TUMOR STAGING CATEGORY TIS: LOBULAR CARCINOMA IN SITU (LCIS): ICD-10-CM

## 2021-06-29 DIAGNOSIS — C50.919 BREAST CANCER (HCC): Primary | ICD-10-CM

## 2021-06-29 DIAGNOSIS — M25.612 DECREASED RANGE OF MOTION OF LEFT SHOULDER: ICD-10-CM

## 2021-06-29 PROCEDURE — 99214 OFFICE O/P EST MOD 30 MIN: CPT | Performed by: INTERNAL MEDICINE

## 2021-06-29 PROCEDURE — 97161 PT EVAL LOW COMPLEX 20 MIN: CPT | Performed by: PHYSICAL THERAPIST

## 2021-06-29 PROCEDURE — 97140 MANUAL THERAPY 1/> REGIONS: CPT | Performed by: PHYSICAL THERAPIST

## 2021-06-29 PROCEDURE — 96402 CHEMO HORMON ANTINEOPL SQ/IM: CPT

## 2021-06-29 RX ORDER — ANASTROZOLE 1 MG/1
1 TABLET ORAL DAILY
Qty: 90 TABLET | Refills: 3 | Status: SHIPPED | OUTPATIENT
Start: 2021-06-29 | End: 2021-09-21

## 2021-06-29 RX ORDER — ANASTROZOLE 1 MG/1
1 TABLET ORAL DAILY
Qty: 15 TABLET | Refills: 0 | Status: SHIPPED | OUTPATIENT
Start: 2021-06-29 | End: 2021-09-10

## 2021-06-29 NOTE — PROGRESS NOTES
Cancer Center Progress Note    Patient Name: Kaylah Mace   YOB: 1975   Medical Record Number: [id number]   Attending Physician: Chavez Swartz M.D.      Chief Complaint:  Breast Cancer    Oncology History:   August 30, 2019: Mammo: nodularity Date   • ASTHMA     allergy induced   • Depression    • HERPES SIMPLEX    • Malignant neoplasm of overlapping sites of left breast in female, [facility] 9/20/2019   • Visual impairment     wears glasses       Past Surgical History:  P expansion, nonlabored breathing  Breast: bilateral mastectomies with reconstruction, left axilla with cording present, ROM ok.    Extremities: No edema, cyanosis, or bruising  Neurological: motor strength grossly intact  Psych: appropriate mood and affect drug management, chronic illness with side effect of treatment       Corrina Hatch MD

## 2021-06-29 NOTE — PROGRESS NOTES
UE LYMPHEDEMA EVALUATION:     Referring Physician: Dr. Grant Cancer  Diagnosis: breast cancer     Date of onset: 4/30/21 Evaluation Date: 6/29/2021     PATIENT SUMMARY   Elise Conley is a 51year old female who presents to therapy today w/ complaints of c 1: Reduction Phase  Has patient been treated for lymphedema before, if yes, when: no  Does the patient have a compression garment: yes (3years old, had gotten it prophylactically)        Pt and therapist discussed evaluation findings, lymphedema education cavitation noted    MLD: neck sequence, abd sequence, L inguinal, L A-I, L axilla, LUE. Will progress towards A-A pathway.     Patient may benefit from home pump    Compression:  - instr pt to wear her compression sleeve and gauntlet she had gotten a few ye co-sign or sign and return this letter via fax as soon as possible to [phone].  If you have any questions, please contact me at Dept: [phone]    Sincerely,  Electronically signed by therapist: Bruno Moore, PT  Physician's certification required

## 2021-06-29 NOTE — PROGRESS NOTES
Q 3 month Lupron given in Right Ventrogluteal muscle. Tolerated injection well. Site covered with a band-aide. Patient states she is feeling well, states she gets home frequently. Discharged back to [facility]. Will see Dr Kaye Hensley at .

## 2021-07-02 ENCOUNTER — OFFICE VISIT (OUTPATIENT)
Dept: PHYSICAL THERAPY | Facility: HOSPITAL | Age: 46
End: 2021-07-02
Attending: INTERNAL MEDICINE
Payer: COMMERCIAL

## 2021-07-02 PROCEDURE — 97140 MANUAL THERAPY 1/> REGIONS: CPT | Performed by: PHYSICAL THERAPIST

## 2021-07-02 NOTE — PROGRESS NOTES
Referring Physician: Dr. Blayne Garcia  Diagnosis: breast cancer     Date of onset: 4/30/21 Evaluation Date: 6/29/2021         Physical Therapy Lymphedema Daily Note    Precautions:  Breast cancer, implants   Education or treatment limitation: none    Past Today’s Treatment and Response:   Date 6/29/2021 7/2/2021     Visit # 0/68  Certification From: 8/77/3512  To:9/27/2021    Visit # 6/21  Certification From: 7/08/1691  To:9/27/2021      Manual Therapy (45 minutes)    STM L axilla, upper arm, a will include: Complete Decongestive Therapy: Manual Therapy, Self-Care/Home Management Training, Therapeutic Exercise, Neuromuscular Re-education, Orthotic Management and Training          Charges: mm3   Total Timed Treatment: 45 min  Total Treatment Time:

## 2021-07-06 RX ORDER — VENLAFAXINE HYDROCHLORIDE 150 MG/1
150 CAPSULE, EXTENDED RELEASE ORAL NIGHTLY
Qty: 90 CAPSULE | Refills: 0 | Status: SHIPPED | OUTPATIENT
Start: 2021-07-06 | End: 2021-09-21

## 2021-07-07 ENCOUNTER — APPOINTMENT (OUTPATIENT)
Dept: PHYSICAL THERAPY | Facility: HOSPITAL | Age: 46
End: 2021-07-07
Attending: INTERNAL MEDICINE
Payer: COMMERCIAL

## 2021-07-09 ENCOUNTER — OFFICE VISIT (OUTPATIENT)
Dept: PHYSICAL THERAPY | Facility: HOSPITAL | Age: 46
End: 2021-07-09
Attending: INTERNAL MEDICINE
Payer: COMMERCIAL

## 2021-07-09 PROCEDURE — 97110 THERAPEUTIC EXERCISES: CPT

## 2021-07-09 PROCEDURE — 97140 MANUAL THERAPY 1/> REGIONS: CPT

## 2021-07-09 NOTE — PROGRESS NOTES
Referring Physician: Dr. Arlene Sebastian  Diagnosis: breast cancer     Date of onset: 4/30/21 Evaluation Date: 6/29/2021         Physical Therapy Lymphedema Daily Note    Precautions:  Breast cancer, implants   Education or treatment limitation: none    Past MEASUREMENT G 26.6   MEASUREMENT H 29.3   MEASUREMENT I 32.8    TOTAL VOLUME  [id number]   DATE MEASURED 6/29/2021   LOCATION/MEASUREMENTS RUE   MEASUREMENT A 15.7   MEASUREMENT B 16.3   MEASUREMENT C 18.3   MEASUREMENT D 21.8   MEASUREMENT E 24.2   JUAN MIGUEL reps each. *handout with pictures and written instructions issued to patient. Discussed that she can try doing the prone exercises every other day.    Self-Care:  Management/Education: Explained Lymphedema diagnosis; informed patient of lymphedema precaut

## 2021-07-13 ENCOUNTER — TELEPHONE (OUTPATIENT)
Dept: PHYSICAL THERAPY | Facility: HOSPITAL | Age: 46
End: 2021-07-13

## 2021-07-13 NOTE — TELEPHONE ENCOUNTER
Vanessa emailed both clinicians she is working with in Physical Therapy. She asked if she could continue on her own until 7/23. She feels that she is managing her swelling and would like to follow up in a week.     Confirmed via email that we are canceling 7

## 2021-07-14 ENCOUNTER — APPOINTMENT (OUTPATIENT)
Dept: PHYSICAL THERAPY | Facility: HOSPITAL | Age: 46
End: 2021-07-14
Attending: INTERNAL MEDICINE
Payer: COMMERCIAL

## 2021-07-16 ENCOUNTER — APPOINTMENT (OUTPATIENT)
Dept: PHYSICAL THERAPY | Facility: HOSPITAL | Age: 46
End: 2021-07-16
Attending: INTERNAL MEDICINE
Payer: COMMERCIAL

## 2021-07-21 ENCOUNTER — APPOINTMENT (OUTPATIENT)
Dept: PHYSICAL THERAPY | Facility: HOSPITAL | Age: 46
End: 2021-07-21
Attending: INTERNAL MEDICINE
Payer: COMMERCIAL

## 2021-07-22 ENCOUNTER — TELEPHONE (OUTPATIENT)
Dept: PHYSICAL THERAPY | Facility: HOSPITAL | Age: 46
End: 2021-07-22

## 2021-07-23 ENCOUNTER — APPOINTMENT (OUTPATIENT)
Dept: PHYSICAL THERAPY | Facility: HOSPITAL | Age: 46
End: 2021-07-23
Attending: INTERNAL MEDICINE
Payer: COMMERCIAL

## 2021-07-27 ENCOUNTER — OFFICE VISIT (OUTPATIENT)
Dept: HEMATOLOGY/ONCOLOGY | Facility: HOSPITAL | Age: 46
End: 2021-07-27
Attending: INTERNAL MEDICINE
Payer: COMMERCIAL

## 2021-07-27 VITALS
OXYGEN SATURATION: 96 % | TEMPERATURE: 98 F | HEART RATE: 80 BPM | RESPIRATION RATE: 16 BRPM | BODY MASS INDEX: 23.82 KG/M2 | HEIGHT: 65 IN | WEIGHT: 143 LBS | SYSTOLIC BLOOD PRESSURE: 102 MMHG | DIASTOLIC BLOOD PRESSURE: 72 MMHG

## 2021-07-27 DIAGNOSIS — Z17.0 MALIGNANT NEOPLASM OF OVERLAPPING SITES OF LEFT BREAST IN FEMALE, ESTROGEN RECEPTOR POSITIVE (HCC): Primary | ICD-10-CM

## 2021-07-27 DIAGNOSIS — Z79.811 USE OF ANASTROZOLE (ARIMIDEX): ICD-10-CM

## 2021-07-27 DIAGNOSIS — C50.812 MALIGNANT NEOPLASM OF OVERLAPPING SITES OF LEFT BREAST IN FEMALE, ESTROGEN RECEPTOR POSITIVE (HCC): Primary | ICD-10-CM

## 2021-07-27 PROCEDURE — 99214 OFFICE O/P EST MOD 30 MIN: CPT | Performed by: INTERNAL MEDICINE

## 2021-07-27 NOTE — PROGRESS NOTES
Cancer Center Progress Note    Patient Name: Linnea Elizalde   YOB: 1975   Medical Record Number: [id number]   Attending Physician: [facility], M.D.      Chief Complaint:  Breast Cancer    Oncology History:   August 30, 2019: Mammo: nodularity 09/04/2019   • MASTECTOMY LEFT     • MASTECTOMY RIGHT     • MASTECTOMY,SIMPLE Bilateral 10/10/19    Bilateral nipple vs skin sparing mastectomy with left SLNB possible left axillary lymph node dissection ([place]) and immediate reconstruction w tissue expan 04/27/2021    BUNCREA 19.0 04/27/2021    CREATSERUM 0.79 04/27/2021    ANIONGAP 6 04/27/2021    GFR >59 03/17/2011    GFRNAA 89 04/27/2021    GFRAA 103 04/27/2021    CA 9.0 04/27/2021    OSMOCALC 296 (H) 04/27/2021    ALKPHO 58 04/27/2021    AST 15 04/27/2

## 2021-07-28 ENCOUNTER — APPOINTMENT (OUTPATIENT)
Dept: HEMATOLOGY/ONCOLOGY | Facility: HOSPITAL | Age: 46
End: 2021-07-28
Attending: INTERNAL MEDICINE
Payer: COMMERCIAL

## 2021-07-30 ENCOUNTER — OFFICE VISIT (OUTPATIENT)
Dept: PHYSICAL THERAPY | Facility: HOSPITAL | Age: 46
End: 2021-07-30
Attending: INTERNAL MEDICINE
Payer: COMMERCIAL

## 2021-07-30 PROCEDURE — 97140 MANUAL THERAPY 1/> REGIONS: CPT

## 2021-07-30 NOTE — PROGRESS NOTES
Referring Physician: Dr. Florida Tidwell  Diagnosis: breast cancer     Date of onset: 4/30/21 Evaluation Date: 6/29/2021       Discharge Summary    Pt has attended 4 visits in Physical Therapy.      Assessment: Patient has attended 4 visits in physical therapy MEASUREMENT C 18.5 18.3   MEASUREMENT D 22 21.8   MEASUREMENT E 24.3 24.2   MEASUREMENT F 23.3 24   MEASUREMENT G 25.9 26.5   MEASUREMENT H 28.2 28.8   MEASUREMENT I 32.4 32.3    TOTAL VOLUME  1724.15472 1753.68944   % DIFFERENCE 3.29535 2.15183 5/92  Certification From: 3/92/0408  VD:4/83/5340  Visit 1/52  Cert from 4/30/9851  To 9/27/2021   Manual Therapy (45 minutes)    STM L axilla, upper arm, area of cording.  Small cavitation noted    MLD: neck sequence, abd sequence, L inguinal, L A-I, L axi patient. Discussed that she can try doing the prone exercises every other day. Self-Care:  Management/Education: Explained Lymphedema diagnosis; informed patient of lymphedema precautions and risk reduction practices, and importance of skin care.  Stevie

## 2021-09-11 ENCOUNTER — LAB ENCOUNTER (OUTPATIENT)
Dept: LAB | Facility: HOSPITAL | Age: 46
End: 2021-09-11
Attending: OBSTETRICS & GYNECOLOGY
Payer: COMMERCIAL

## 2021-09-11 DIAGNOSIS — Z01.818 PREOP TESTING: ICD-10-CM

## 2021-09-13 LAB — SARS-COV-2 RNA RESP QL NAA+PROBE: NOT DETECTED

## 2021-09-14 ENCOUNTER — HOSPITAL ENCOUNTER (OUTPATIENT)
Facility: HOSPITAL | Age: 46
Setting detail: HOSPITAL OUTPATIENT SURGERY
Discharge: HOME | End: 2021-09-14
Attending: OBSTETRICS & GYNECOLOGY | Admitting: OBSTETRICS & GYNECOLOGY
Payer: COMMERCIAL

## 2021-09-14 ENCOUNTER — ANESTHESIA EVENT (OUTPATIENT)
Dept: SURGERY | Facility: HOSPITAL | Age: 46
End: 2021-09-14
Payer: COMMERCIAL

## 2021-09-14 ENCOUNTER — ANESTHESIA (OUTPATIENT)
Dept: SURGERY | Facility: HOSPITAL | Age: 46
End: 2021-09-14
Payer: COMMERCIAL

## 2021-09-14 VITALS
WEIGHT: 144 LBS | OXYGEN SATURATION: 97 % | SYSTOLIC BLOOD PRESSURE: 121 MMHG | TEMPERATURE: 98 F | BODY MASS INDEX: 23.99 KG/M2 | HEIGHT: 65 IN | HEART RATE: 70 BPM | RESPIRATION RATE: 16 BRPM | DIASTOLIC BLOOD PRESSURE: 71 MMHG

## 2021-09-14 DIAGNOSIS — Z01.818 PREOP TESTING: Primary | ICD-10-CM

## 2021-09-14 LAB — B-HCG UR QL: NEGATIVE

## 2021-09-14 PROCEDURE — 81025 URINE PREGNANCY TEST: CPT

## 2021-09-14 PROCEDURE — 0UT24ZZ RESECTION OF BILATERAL OVARIES, PERCUTANEOUS ENDOSCOPIC APPROACH: ICD-10-PCS | Performed by: OBSTETRICS & GYNECOLOGY

## 2021-09-14 PROCEDURE — 88305 TISSUE EXAM BY PATHOLOGIST: CPT | Performed by: OBSTETRICS & GYNECOLOGY

## 2021-09-14 PROCEDURE — 0UT74ZZ RESECTION OF BILATERAL FALLOPIAN TUBES, PERCUTANEOUS ENDOSCOPIC APPROACH: ICD-10-PCS | Performed by: OBSTETRICS & GYNECOLOGY

## 2021-09-14 PROCEDURE — 8E0W4CZ ROBOTIC ASSISTED PROCEDURE OF TRUNK REGION, PERCUTANEOUS ENDOSCOPIC APPROACH: ICD-10-PCS | Performed by: OBSTETRICS & GYNECOLOGY

## 2021-09-14 RX ORDER — CEFAZOLIN SODIUM/WATER 2 G/20 ML
2 SYRINGE (ML) INTRAVENOUS ONCE
Status: COMPLETED | OUTPATIENT
Start: 2021-09-14 | End: 2021-09-14

## 2021-09-14 RX ORDER — GLYCOPYRROLATE 0.2 MG/ML
INJECTION, SOLUTION INTRAMUSCULAR; INTRAVENOUS AS NEEDED
Status: DISCONTINUED | OUTPATIENT
Start: 2021-09-14 | End: 2021-09-14 | Stop reason: SURG

## 2021-09-14 RX ORDER — HALOPERIDOL 5 MG/ML
0.25 INJECTION INTRAMUSCULAR ONCE AS NEEDED
Status: DISCONTINUED | OUTPATIENT
Start: 2021-09-14 | End: 2021-09-14

## 2021-09-14 RX ORDER — HYDROMORPHONE HYDROCHLORIDE 1 MG/ML
0.4 INJECTION, SOLUTION INTRAMUSCULAR; INTRAVENOUS; SUBCUTANEOUS EVERY 5 MIN PRN
Status: DISCONTINUED | OUTPATIENT
Start: 2021-09-14 | End: 2021-09-14

## 2021-09-14 RX ORDER — LIDOCAINE HYDROCHLORIDE 10 MG/ML
INJECTION, SOLUTION EPIDURAL; INFILTRATION; INTRACAUDAL; PERINEURAL AS NEEDED
Status: DISCONTINUED | OUTPATIENT
Start: 2021-09-14 | End: 2021-09-14 | Stop reason: SURG

## 2021-09-14 RX ORDER — SODIUM CHLORIDE, SODIUM LACTATE, POTASSIUM CHLORIDE, CALCIUM CHLORIDE 600; 310; 30; 20 MG/100ML; MG/100ML; MG/100ML; MG/100ML
INJECTION, SOLUTION INTRAVENOUS CONTINUOUS
Status: DISCONTINUED | OUTPATIENT
Start: 2021-09-14 | End: 2021-09-14

## 2021-09-14 RX ORDER — MORPHINE SULFATE 4 MG/ML
4 INJECTION, SOLUTION INTRAMUSCULAR; INTRAVENOUS EVERY 10 MIN PRN
Status: DISCONTINUED | OUTPATIENT
Start: 2021-09-14 | End: 2021-09-14

## 2021-09-14 RX ORDER — KETOROLAC TROMETHAMINE 30 MG/ML
INJECTION, SOLUTION INTRAMUSCULAR; INTRAVENOUS AS NEEDED
Status: DISCONTINUED | OUTPATIENT
Start: 2021-09-14 | End: 2021-09-14 | Stop reason: SURG

## 2021-09-14 RX ORDER — ACETAMINOPHEN 500 MG
1000 TABLET ORAL ONCE
Status: COMPLETED | OUTPATIENT
Start: 2021-09-14 | End: 2021-09-14

## 2021-09-14 RX ORDER — HYDROCODONE BITARTRATE AND ACETAMINOPHEN 5; 325 MG/1; MG/1
1 TABLET ORAL EVERY 6 HOURS PRN
Qty: 10 TABLET | Refills: 0 | Status: SHIPPED | OUTPATIENT
Start: 2021-09-14 | End: 2021-09-21 | Stop reason: ALTCHOICE

## 2021-09-14 RX ORDER — HYDROMORPHONE HYDROCHLORIDE 1 MG/ML
0.2 INJECTION, SOLUTION INTRAMUSCULAR; INTRAVENOUS; SUBCUTANEOUS EVERY 5 MIN PRN
Status: DISCONTINUED | OUTPATIENT
Start: 2021-09-14 | End: 2021-09-14

## 2021-09-14 RX ORDER — BUPIVACAINE HYDROCHLORIDE AND EPINEPHRINE 2.5; 5 MG/ML; UG/ML
INJECTION, SOLUTION INFILTRATION; PERINEURAL AS NEEDED
Status: DISCONTINUED | OUTPATIENT
Start: 2021-09-14 | End: 2021-09-14 | Stop reason: HOSPADM

## 2021-09-14 RX ORDER — ONDANSETRON 2 MG/ML
INJECTION INTRAMUSCULAR; INTRAVENOUS AS NEEDED
Status: DISCONTINUED | OUTPATIENT
Start: 2021-09-14 | End: 2021-09-14 | Stop reason: SURG

## 2021-09-14 RX ORDER — NEOSTIGMINE METHYLSULFATE 1 MG/ML
INJECTION INTRAVENOUS AS NEEDED
Status: DISCONTINUED | OUTPATIENT
Start: 2021-09-14 | End: 2021-09-14 | Stop reason: SURG

## 2021-09-14 RX ORDER — MIDAZOLAM HYDROCHLORIDE 1 MG/ML
INJECTION INTRAMUSCULAR; INTRAVENOUS AS NEEDED
Status: DISCONTINUED | OUTPATIENT
Start: 2021-09-14 | End: 2021-09-14 | Stop reason: SURG

## 2021-09-14 RX ORDER — PROCHLORPERAZINE EDISYLATE 5 MG/ML
5 INJECTION INTRAMUSCULAR; INTRAVENOUS ONCE AS NEEDED
Status: DISCONTINUED | OUTPATIENT
Start: 2021-09-14 | End: 2021-09-14

## 2021-09-14 RX ORDER — MORPHINE SULFATE 4 MG/ML
2 INJECTION, SOLUTION INTRAMUSCULAR; INTRAVENOUS EVERY 10 MIN PRN
Status: DISCONTINUED | OUTPATIENT
Start: 2021-09-14 | End: 2021-09-14

## 2021-09-14 RX ORDER — IBUPROFEN 600 MG/1
600 TABLET ORAL EVERY 8 HOURS PRN
Qty: 30 TABLET | Refills: 0 | Status: SHIPPED | OUTPATIENT
Start: 2021-09-14 | End: 2021-09-21 | Stop reason: ALTCHOICE

## 2021-09-14 RX ORDER — HYDROMORPHONE HYDROCHLORIDE 1 MG/ML
0.6 INJECTION, SOLUTION INTRAMUSCULAR; INTRAVENOUS; SUBCUTANEOUS EVERY 5 MIN PRN
Status: DISCONTINUED | OUTPATIENT
Start: 2021-09-14 | End: 2021-09-14

## 2021-09-14 RX ORDER — ONDANSETRON 2 MG/ML
4 INJECTION INTRAMUSCULAR; INTRAVENOUS ONCE AS NEEDED
Status: DISCONTINUED | OUTPATIENT
Start: 2021-09-14 | End: 2021-09-14

## 2021-09-14 RX ORDER — SODIUM CHLORIDE 9 MG/ML
INJECTION, SOLUTION INTRAVENOUS CONTINUOUS PRN
Status: DISCONTINUED | OUTPATIENT
Start: 2021-09-14 | End: 2021-09-14 | Stop reason: SURG

## 2021-09-14 RX ORDER — HYDROCODONE BITARTRATE AND ACETAMINOPHEN 5; 325 MG/1; MG/1
2 TABLET ORAL AS NEEDED
Status: DISCONTINUED | OUTPATIENT
Start: 2021-09-14 | End: 2021-09-14

## 2021-09-14 RX ORDER — MORPHINE SULFATE 10 MG/ML
6 INJECTION, SOLUTION INTRAMUSCULAR; INTRAVENOUS EVERY 10 MIN PRN
Status: DISCONTINUED | OUTPATIENT
Start: 2021-09-14 | End: 2021-09-14

## 2021-09-14 RX ORDER — ROCURONIUM BROMIDE 10 MG/ML
INJECTION, SOLUTION INTRAVENOUS AS NEEDED
Status: DISCONTINUED | OUTPATIENT
Start: 2021-09-14 | End: 2021-09-14 | Stop reason: SURG

## 2021-09-14 RX ORDER — DEXAMETHASONE SODIUM PHOSPHATE 4 MG/ML
VIAL (ML) INJECTION AS NEEDED
Status: DISCONTINUED | OUTPATIENT
Start: 2021-09-14 | End: 2021-09-14 | Stop reason: SURG

## 2021-09-14 RX ORDER — HYDROCODONE BITARTRATE AND ACETAMINOPHEN 5; 325 MG/1; MG/1
1 TABLET ORAL AS NEEDED
Status: DISCONTINUED | OUTPATIENT
Start: 2021-09-14 | End: 2021-09-14

## 2021-09-14 RX ORDER — NALOXONE HYDROCHLORIDE 0.4 MG/ML
80 INJECTION, SOLUTION INTRAMUSCULAR; INTRAVENOUS; SUBCUTANEOUS AS NEEDED
Status: DISCONTINUED | OUTPATIENT
Start: 2021-09-14 | End: 2021-09-14

## 2021-09-14 RX ORDER — HEPARIN SODIUM 1000 [USP'U]/ML
INJECTION, SOLUTION INTRAVENOUS; SUBCUTANEOUS AS NEEDED
Status: DISCONTINUED | OUTPATIENT
Start: 2021-09-14 | End: 2021-09-14 | Stop reason: HOSPADM

## 2021-09-14 RX ADMIN — KETOROLAC TROMETHAMINE 30 MG: 30 INJECTION, SOLUTION INTRAMUSCULAR; INTRAVENOUS at 08:46:00

## 2021-09-14 RX ADMIN — GLYCOPYRROLATE 0.6 MG: 0.2 INJECTION, SOLUTION INTRAMUSCULAR; INTRAVENOUS at 08:45:00

## 2021-09-14 RX ADMIN — ROCURONIUM BROMIDE 40 MG: 10 INJECTION, SOLUTION INTRAVENOUS at 07:38:00

## 2021-09-14 RX ADMIN — MIDAZOLAM HYDROCHLORIDE 2 MG: 1 INJECTION INTRAMUSCULAR; INTRAVENOUS at 07:34:00

## 2021-09-14 RX ADMIN — NEOSTIGMINE METHYLSULFATE 4 MG: 1 INJECTION INTRAVENOUS at 08:45:00

## 2021-09-14 RX ADMIN — DEXAMETHASONE SODIUM PHOSPHATE 4 MG: 4 MG/ML VIAL (ML) INJECTION at 07:34:00

## 2021-09-14 RX ADMIN — SODIUM CHLORIDE: 9 INJECTION, SOLUTION INTRAVENOUS at 09:06:00

## 2021-09-14 RX ADMIN — SODIUM CHLORIDE: 9 INJECTION, SOLUTION INTRAVENOUS at 07:44:00

## 2021-09-14 RX ADMIN — LIDOCAINE HYDROCHLORIDE 30 MG: 10 INJECTION, SOLUTION EPIDURAL; INFILTRATION; INTRACAUDAL; PERINEURAL at 07:38:00

## 2021-09-14 RX ADMIN — ONDANSETRON 4 MG: 2 INJECTION INTRAMUSCULAR; INTRAVENOUS at 07:34:00

## 2021-09-14 RX ADMIN — GLYCOPYRROLATE 0.1 MG: 0.2 INJECTION, SOLUTION INTRAMUSCULAR; INTRAVENOUS at 07:34:00

## 2021-09-14 RX ADMIN — SODIUM CHLORIDE, SODIUM LACTATE, POTASSIUM CHLORIDE, CALCIUM CHLORIDE: 600; 310; 30; 20 INJECTION, SOLUTION INTRAVENOUS at 09:06:00

## 2021-09-14 RX ADMIN — CEFAZOLIN SODIUM/WATER 2 G: 2 G/20 ML SYRINGE (ML) INTRAVENOUS at 07:44:00

## 2021-09-14 NOTE — H&P
Kaylah Mace is a 51year old female. Patient's last menstrual period was 11/25/2020 (exact date).   HPI:    Kaylah Mace is here for consult for bilateral oophorectomy, Patient has history of breast cancer and BSO was recommended by oncologist.     denies itching  MUSCULOSKELETAL: denies back pain  NEURO: denies headaches, denies extremity weakness  PSYCHE: denies depression denies anxiety  HEMATOLOGIC: denies hx of anemia, denies easy bruising or bleeding  ENDOCRINE: denies excessive thirst or

## 2021-09-14 NOTE — OPERATIVE REPORT
[facility] HOSP - [facility]     Operative Note    Kiarra Yadav Patient Status:  Hospital Outpatient Surgery    3/12/1975 MRN [id number]   Location [place] [address] Attending Jennifer Denis MD   Hosp Day # 0 PCP Nelli Dunbar MD the umbilical site and the specimens were removed. The lower trocars were removed under direct vision without difficulty. Gas was allowed to escape and equipment removed. The fascia from the midline port was closed with O vicryl.   Skin closed with O vi

## 2021-09-14 NOTE — ANESTHESIA PREPROCEDURE EVALUATION
Anesthesia PreOp Note    HPI:     Lalo Troy is a 51year old female who presents for preoperative consultation requested by: Sherie Vickers MD    Date of Surgery: 9/14/2021    Procedure(s):  robotic bilateral salpingo-oophorectomy  Indication: histo SURGICAL HISTORY      Liposuction 12 years ago       Venlafaxine HCl  MG Oral Capsule SR 24 Hr, Take 1 capsule (150 mg total) by mouth nightly., Disp: 90 capsule, Rfl: 0, 9/13/2021 at 2100  anastrozole 1 MG Oral Tab tab, Take 1 tablet (1 mg total) by file  Food Insecurity:       Worried About [address] in the Last Year: Not on file      Ran Out of Food in the Last Year: Not on file  Transportation Needs:       Lack of Transportation (Medical):  Not on file      Lack of Transportation (Non-Medi Cardiovascular - negative ROS and normal exam    Neuro/Psych    (+)  depression,       GI/Hepatic/Renal - negative ROS     Endo/Other      Comments: S/P bilat mastectomy for L CA  Abdominal  - normal exam               Anesthesia Plan:   ASA:  2  Plan:   G

## 2021-09-14 NOTE — ANESTHESIA PROCEDURE NOTES
Airway  Date/Time: 9/14/2021 7:41 AM  Urgency: elective    Airway not difficult    General Information and Staff    Patient location during procedure: OR  Anesthesiologist: Sharee Christian MD  Resident/CRNA: Ze Hernandez CRNA  Performed: CRNA

## 2021-09-14 NOTE — DISCHARGE SUMMARY
[facility] HOSP - [facility]    Discharge Summary    Linnea Elizalde Patient Status:  Hospital Outpatient Surgery    3/12/1975 MRN [id number]   Location [place] [address] Attending Aleksandr Bai MD   Hosp Day # 0 PCP Jennifer Gars[address], [id number]  [phone]                                 Other Discharge Instructions:        HOME INSTRUCTIONS  [facility] HOME CARE INSTRUCTIONS: POST-OP ANESTHESIA  The medication that you received for sedation or general anesthesia can last up to 24 hours.  You are at home:    · Call your surgeons office         Cristi Horan MD  9/14/2021

## 2021-09-14 NOTE — ANESTHESIA POSTPROCEDURE EVALUATION
Patient:  Baylee Lino    Procedure Summary     Date: 09/14/21 Room / Location: [facility] OR 06 / [facility] OR    Anesthesia Start: 0858 Anesthesia Stop: 5477    Procedure: robotic bilateral salpingo-oophorectomy (N/A ) Diagnosis: (history of breast cancer)

## 2021-09-21 ENCOUNTER — APPOINTMENT (OUTPATIENT)
Dept: HEMATOLOGY/ONCOLOGY | Facility: HOSPITAL | Age: 46
End: 2021-09-21
Attending: INTERNAL MEDICINE
Payer: COMMERCIAL

## 2021-09-21 VITALS
BODY MASS INDEX: 24.49 KG/M2 | SYSTOLIC BLOOD PRESSURE: 110 MMHG | DIASTOLIC BLOOD PRESSURE: 64 MMHG | WEIGHT: 147 LBS | RESPIRATION RATE: 16 BRPM | HEART RATE: 99 BPM | OXYGEN SATURATION: 98 % | HEIGHT: 65 IN | TEMPERATURE: 99 F

## 2021-09-21 DIAGNOSIS — D05.01 NEOPLASM OF RIGHT BREAST, PRIMARY TUMOR STAGING CATEGORY TIS: LOBULAR CARCINOMA IN SITU (LCIS): ICD-10-CM

## 2021-09-21 DIAGNOSIS — C50.812 MALIGNANT NEOPLASM OF OVERLAPPING SITES OF LEFT BREAST IN FEMALE, ESTROGEN RECEPTOR POSITIVE (HCC): Primary | ICD-10-CM

## 2021-09-21 DIAGNOSIS — Z17.0 MALIGNANT NEOPLASM OF OVERLAPPING SITES OF LEFT BREAST IN FEMALE, ESTROGEN RECEPTOR POSITIVE (HCC): Primary | ICD-10-CM

## 2021-09-21 DIAGNOSIS — Z79.811 USE OF ANASTROZOLE (ARIMIDEX): ICD-10-CM

## 2021-09-21 PROBLEM — Z79.810 LONG-TERM CURRENT USE OF TAMOXIFEN: Status: RESOLVED | Noted: 2020-02-27 | Resolved: 2021-09-21

## 2021-09-21 PROCEDURE — 99214 OFFICE O/P EST MOD 30 MIN: CPT | Performed by: INTERNAL MEDICINE

## 2021-09-21 RX ORDER — ANASTROZOLE 1 MG/1
1 TABLET ORAL DAILY
Qty: 90 TABLET | Refills: 3 | Status: SHIPPED | OUTPATIENT
Start: 2021-09-21

## 2021-09-21 RX ORDER — VENLAFAXINE HYDROCHLORIDE 150 MG/1
150 CAPSULE, EXTENDED RELEASE ORAL NIGHTLY
Qty: 90 CAPSULE | Refills: 3 | Status: SHIPPED | OUTPATIENT
Start: 2021-09-21

## 2021-09-21 NOTE — PROGRESS NOTES
Cancer Center Progress Note    Patient Name: Maximo Romero   YOB: 1975   Medical Record Number: [id number]   Attending Physician: Corrina Hatch M.D.      Chief Complaint:  Breast Cancer  Use or AI    Oncology History:   August 30, 2019: screen Relation Age of Onset   • Breast Cancer Mother 62   • Diabetes Mother    • Other (chronic leukemia) Mother 61        likely CLL   • No Known Problems Father    • Colon Cancer Maternal Grandfather 79   • Colon Cancer Paternal Grandfather 79   • No Known Pro 1.6 05/02/2014     04/27/2021    K 3.6 04/27/2021     04/27/2021    CO2 29.0 04/27/2021        Impression and Plan:  Cancer Staging  Malignant neoplasm of overlapping sites of left breast in female, estrogen receptor positive ([facility]  Staging for

## 2021-09-22 ENCOUNTER — APPOINTMENT (OUTPATIENT)
Dept: HEMATOLOGY/ONCOLOGY | Facility: HOSPITAL | Age: 46
End: 2021-09-22
Attending: INTERNAL MEDICINE
Payer: COMMERCIAL

## 2021-12-21 ENCOUNTER — OFFICE VISIT (OUTPATIENT)
Dept: HEMATOLOGY/ONCOLOGY | Facility: HOSPITAL | Age: 46
End: 2021-12-21
Attending: INTERNAL MEDICINE
Payer: COMMERCIAL

## 2021-12-21 VITALS
TEMPERATURE: 98 F | DIASTOLIC BLOOD PRESSURE: 74 MMHG | HEIGHT: 65 IN | WEIGHT: 141.81 LBS | HEART RATE: 76 BPM | SYSTOLIC BLOOD PRESSURE: 108 MMHG | RESPIRATION RATE: 16 BRPM | OXYGEN SATURATION: 99 % | BODY MASS INDEX: 23.63 KG/M2

## 2021-12-21 DIAGNOSIS — Z17.0 MALIGNANT NEOPLASM OF OVERLAPPING SITES OF LEFT BREAST IN FEMALE, ESTROGEN RECEPTOR POSITIVE (HCC): ICD-10-CM

## 2021-12-21 DIAGNOSIS — C50.812 MALIGNANT NEOPLASM OF OVERLAPPING SITES OF LEFT BREAST IN FEMALE, ESTROGEN RECEPTOR POSITIVE (HCC): ICD-10-CM

## 2021-12-21 DIAGNOSIS — Z79.811 USE OF ANASTROZOLE (ARIMIDEX): Primary | ICD-10-CM

## 2021-12-21 PROCEDURE — 99214 OFFICE O/P EST MOD 30 MIN: CPT | Performed by: INTERNAL MEDICINE

## 2021-12-21 NOTE — PROGRESS NOTES
Cancer Center Progress Note    Patient Name: Justine Burroughs   YOB: 1975   Medical Record Number: [id number]   Attending Physician: Florida Tidwell M.D.      Chief Complaint:  Breast cancer  Arimidex    Oncology History:   August 30, 2019: screen ([place])   • OTHER SURGICAL HISTORY      Liposuction 12 years ago       Family History:  Family History   Problem Relation Age of Onset   • Breast Cancer Mother 62   • Diabetes Mother    • Other (chronic leukemia) Mother 61        likely CLL   • No Known P 04/27/2021    GLOBULIN 3.3 04/27/2021    AGRATIO 1.6 05/02/2014     04/27/2021    K 3.6 04/27/2021     04/27/2021    CO2 29.0 04/27/2021        Impression and Plan:  Cancer Staging  Malignant neoplasm of overlapping sites of left breast in fema

## 2022-06-15 NOTE — PROGRESS NOTES
Here for post op, bilat fill; OR 10-10-19 Immediate bilateral breast reconstruction with tissue expander and acellular dermal matrix; no complaints at this time.
Wesley Mccormack is a 38year old female who presents today for a follow-up. She denies fever and chills. She denies nausea, vomiting, diarrhea or constipation. Physical Examination:  Breasts: Bilateral breast incisions are clean dry and intact.   Pr
15-Gavin-2022 00:15

## 2022-06-21 ENCOUNTER — APPOINTMENT (OUTPATIENT)
Dept: HEMATOLOGY/ONCOLOGY | Facility: HOSPITAL | Age: 47
End: 2022-06-21
Attending: INTERNAL MEDICINE
Payer: COMMERCIAL

## 2022-06-22 ENCOUNTER — OFFICE VISIT (OUTPATIENT)
Dept: HEMATOLOGY/ONCOLOGY | Facility: HOSPITAL | Age: 47
End: 2022-06-22
Attending: INTERNAL MEDICINE
Payer: COMMERCIAL

## 2022-06-22 VITALS
HEIGHT: 65 IN | BODY MASS INDEX: 24.49 KG/M2 | SYSTOLIC BLOOD PRESSURE: 110 MMHG | RESPIRATION RATE: 16 BRPM | WEIGHT: 147 LBS | HEART RATE: 94 BPM | DIASTOLIC BLOOD PRESSURE: 67 MMHG | OXYGEN SATURATION: 95 % | TEMPERATURE: 98 F

## 2022-06-22 DIAGNOSIS — Z79.811 USE OF ANASTROZOLE (ARIMIDEX): ICD-10-CM

## 2022-06-22 DIAGNOSIS — Z17.0 MALIGNANT NEOPLASM OF OVERLAPPING SITES OF LEFT BREAST IN FEMALE, ESTROGEN RECEPTOR POSITIVE (HCC): Primary | ICD-10-CM

## 2022-06-22 DIAGNOSIS — C50.812 MALIGNANT NEOPLASM OF OVERLAPPING SITES OF LEFT BREAST IN FEMALE, ESTROGEN RECEPTOR POSITIVE (HCC): Primary | ICD-10-CM

## 2022-06-22 PROCEDURE — 99214 OFFICE O/P EST MOD 30 MIN: CPT | Performed by: INTERNAL MEDICINE

## 2022-08-16 ENCOUNTER — OFFICE VISIT (OUTPATIENT)
Dept: INTERNAL MEDICINE CLINIC | Facility: CLINIC | Age: 47
End: 2022-08-16
Payer: COMMERCIAL

## 2022-08-16 VITALS
WEIGHT: 143.38 LBS | OXYGEN SATURATION: 97 % | HEART RATE: 84 BPM | HEIGHT: 65 IN | DIASTOLIC BLOOD PRESSURE: 82 MMHG | BODY MASS INDEX: 23.89 KG/M2 | SYSTOLIC BLOOD PRESSURE: 110 MMHG

## 2022-08-16 DIAGNOSIS — Z17.0 MALIGNANT NEOPLASM OF OVERLAPPING SITES OF LEFT BREAST IN FEMALE, ESTROGEN RECEPTOR POSITIVE (HCC): ICD-10-CM

## 2022-08-16 DIAGNOSIS — Z79.811 USE OF ANASTROZOLE (ARIMIDEX): ICD-10-CM

## 2022-08-16 DIAGNOSIS — R63.5 ABNORMAL WEIGHT GAIN: ICD-10-CM

## 2022-08-16 DIAGNOSIS — R53.83 OTHER FATIGUE: ICD-10-CM

## 2022-08-16 DIAGNOSIS — Z00.00 ANNUAL PHYSICAL EXAM: Primary | ICD-10-CM

## 2022-08-16 DIAGNOSIS — C50.812 MALIGNANT NEOPLASM OF OVERLAPPING SITES OF LEFT BREAST IN FEMALE, ESTROGEN RECEPTOR POSITIVE (HCC): ICD-10-CM

## 2022-08-16 LAB
BASOPHILS # BLD AUTO: 0.04 X10(3) UL (ref 0–0.2)
BASOPHILS NFR BLD AUTO: 0.8 %
CHOLEST SERPL-MCNC: 235 MG/DL (ref ?–200)
DEPRECATED RDW RBC AUTO: 38.5 FL (ref 35.1–46.3)
EOSINOPHIL # BLD AUTO: 0.03 X10(3) UL (ref 0–0.7)
EOSINOPHIL NFR BLD AUTO: 0.6 %
ERYTHROCYTE [DISTWIDTH] IN BLOOD BY AUTOMATED COUNT: 11.8 % (ref 11–15)
EST. AVERAGE GLUCOSE BLD GHB EST-MCNC: 117 MG/DL (ref 68–126)
FASTING PATIENT LIPID ANSWER: YES
HBA1C MFR BLD: 5.7 % (ref ?–5.7)
HCT VFR BLD AUTO: 39.9 %
HDLC SERPL-MCNC: 70 MG/DL (ref 40–59)
HGB BLD-MCNC: 13.1 G/DL
IMM GRANULOCYTES # BLD AUTO: 0.01 X10(3) UL (ref 0–1)
IMM GRANULOCYTES NFR BLD: 0.2 %
IRON SATN MFR SERPL: 23 %
IRON SERPL-MCNC: 79 UG/DL
LDLC SERPL CALC-MCNC: 150 MG/DL (ref ?–100)
LYMPHOCYTES # BLD AUTO: 1.12 X10(3) UL (ref 1–4)
LYMPHOCYTES NFR BLD AUTO: 21.3 %
MCH RBC QN AUTO: 29.6 PG (ref 26–34)
MCHC RBC AUTO-ENTMCNC: 32.8 G/DL (ref 31–37)
MCV RBC AUTO: 90.1 FL
MONOCYTES # BLD AUTO: 0.43 X10(3) UL (ref 0.1–1)
MONOCYTES NFR BLD AUTO: 8.2 %
NEUTROPHILS # BLD AUTO: 3.64 X10 (3) UL (ref 1.5–7.7)
NEUTROPHILS # BLD AUTO: 3.64 X10(3) UL (ref 1.5–7.7)
NEUTROPHILS NFR BLD AUTO: 68.9 %
NONHDLC SERPL-MCNC: 165 MG/DL (ref ?–130)
PLATELET # BLD AUTO: 246 10(3)UL (ref 150–450)
RBC # BLD AUTO: 4.43 X10(6)UL
RHEUMATOID FACT SERPL-ACNC: <10 IU/ML (ref ?–15)
TIBC SERPL-MCNC: 343 UG/DL (ref 240–450)
TRANSFERRIN SERPL-MCNC: 230 MG/DL (ref 200–360)
TRIGL SERPL-MCNC: 87 MG/DL (ref 30–149)
TSI SER-ACNC: 0.96 MIU/ML (ref 0.36–3.74)
VIT B12 SERPL-MCNC: 394 PG/ML (ref 193–986)
VIT D+METAB SERPL-MCNC: 22.5 NG/ML (ref 30–100)
VLDLC SERPL CALC-MCNC: 16 MG/DL (ref 0–30)
WBC # BLD AUTO: 5.3 X10(3) UL (ref 4–11)

## 2022-08-16 PROCEDURE — 3074F SYST BP LT 130 MM HG: CPT | Performed by: INTERNAL MEDICINE

## 2022-08-16 PROCEDURE — 80061 LIPID PANEL: CPT | Performed by: INTERNAL MEDICINE

## 2022-08-16 PROCEDURE — 3008F BODY MASS INDEX DOCD: CPT | Performed by: INTERNAL MEDICINE

## 2022-08-16 PROCEDURE — 3079F DIAST BP 80-89 MM HG: CPT | Performed by: INTERNAL MEDICINE

## 2022-08-16 PROCEDURE — 86431 RHEUMATOID FACTOR QUANT: CPT | Performed by: INTERNAL MEDICINE

## 2022-08-16 PROCEDURE — 82306 VITAMIN D 25 HYDROXY: CPT | Performed by: INTERNAL MEDICINE

## 2022-08-16 PROCEDURE — 85025 COMPLETE CBC W/AUTO DIFF WBC: CPT | Performed by: INTERNAL MEDICINE

## 2022-08-16 PROCEDURE — 83540 ASSAY OF IRON: CPT | Performed by: INTERNAL MEDICINE

## 2022-08-16 PROCEDURE — 99386 PREV VISIT NEW AGE 40-64: CPT | Performed by: INTERNAL MEDICINE

## 2022-08-16 PROCEDURE — 84466 ASSAY OF TRANSFERRIN: CPT | Performed by: INTERNAL MEDICINE

## 2022-08-16 PROCEDURE — 82607 VITAMIN B-12: CPT | Performed by: INTERNAL MEDICINE

## 2022-08-16 PROCEDURE — 83036 HEMOGLOBIN GLYCOSYLATED A1C: CPT | Performed by: INTERNAL MEDICINE

## 2022-08-16 PROCEDURE — 84443 ASSAY THYROID STIM HORMONE: CPT | Performed by: INTERNAL MEDICINE

## 2022-08-16 PROCEDURE — 86200 CCP ANTIBODY: CPT | Performed by: INTERNAL MEDICINE

## 2022-08-16 RX ORDER — ALBUTEROL SULFATE 90 UG/1
2 AEROSOL, METERED RESPIRATORY (INHALATION) EVERY 6 HOURS PRN
Qty: 6.7 G | Refills: 1 | Status: SHIPPED | OUTPATIENT
Start: 2022-08-16

## 2022-08-19 ENCOUNTER — TELEPHONE (OUTPATIENT)
Dept: HEMATOLOGY/ONCOLOGY | Facility: HOSPITAL | Age: 47
End: 2022-08-19

## 2022-08-19 LAB — CCP IGG SERPL-ACNC: 1.4 U/ML (ref 0–6.9)

## 2022-08-19 RX ORDER — ERGOCALCIFEROL 1.25 MG/1
50000 CAPSULE ORAL WEEKLY
Qty: 12 CAPSULE | Refills: 1 | Status: SHIPPED | OUTPATIENT
Start: 2022-08-19 | End: 2022-11-05

## 2022-08-22 ENCOUNTER — TELEPHONE (OUTPATIENT)
Dept: INTERNAL MEDICINE CLINIC | Facility: CLINIC | Age: 47
End: 2022-08-22

## 2022-08-22 NOTE — TELEPHONE ENCOUNTER
Called the patient back some body answered she is not available   She will call back when she is available

## 2022-08-22 NOTE — TELEPHONE ENCOUNTER
ANSWERING SERVICE MESSAGE FROM 08/18/2022     PATIENT WOULD LIKE TO SPEAK TO  REGARDING TEST RESULTS

## 2022-11-08 RX ORDER — VENLAFAXINE HYDROCHLORIDE 150 MG/1
150 CAPSULE, EXTENDED RELEASE ORAL NIGHTLY
Qty: 90 CAPSULE | Refills: 3 | Status: SHIPPED | OUTPATIENT
Start: 2022-11-08

## 2022-11-09 ENCOUNTER — NURSE ONLY (OUTPATIENT)
Facility: CLINIC | Age: 47
End: 2022-11-09

## 2022-11-09 DIAGNOSIS — Z12.11 SCREEN FOR COLON CANCER: Primary | ICD-10-CM

## 2022-11-09 NOTE — PROGRESS NOTES
Dr. Adelia Bertrand    TCS appointment completed with Kameron Mesa. Referral entered by Dr. Cameron Qiu. This will be her first colonoscopy. Reconciled medications, preferred pharmacy and allergies. No current GI related symptoms. Anticoagulants: No   Diabetic Meds: No   BP meds(Ace inhibitors/ARB's): No   Weight loss meds (phentermine/vyvanse): No   Iron supplement (RX/OTC): No   Height & Weight/BMI: 5'5\"; 142 lb; BMI: 23.6    Hx of Cardiac/CVA issues/(MI/Stroke): No  Devices Pacemaker/Defibrillator/Stents: No   Resp. Issues/Oxygen Use/TORREY/COPD: No   Issues w/Anesthesia: No     Special comments/notes: None     Please advise on orders and prep. Thank you!

## 2022-11-09 NOTE — PROGRESS NOTES
Ok to schedule colonoscopy with MAC with split golytely for colorectal cancer screening at [address] [place] [address] or [facility]    Thanks    Anton Ryder MD  [facility] - Gastroenterology

## 2022-12-14 ENCOUNTER — OFFICE VISIT (OUTPATIENT)
Dept: HEMATOLOGY/ONCOLOGY | Facility: HOSPITAL | Age: 47
End: 2022-12-14
Attending: INTERNAL MEDICINE
Payer: COMMERCIAL

## 2022-12-14 VITALS
TEMPERATURE: 99 F | HEIGHT: 65 IN | WEIGHT: 143.63 LBS | HEART RATE: 82 BPM | BODY MASS INDEX: 23.93 KG/M2 | RESPIRATION RATE: 16 BRPM | DIASTOLIC BLOOD PRESSURE: 75 MMHG | OXYGEN SATURATION: 100 % | SYSTOLIC BLOOD PRESSURE: 115 MMHG

## 2022-12-14 DIAGNOSIS — C50.812 MALIGNANT NEOPLASM OF OVERLAPPING SITES OF LEFT BREAST IN FEMALE, ESTROGEN RECEPTOR POSITIVE (HCC): ICD-10-CM

## 2022-12-14 DIAGNOSIS — E55.9 VITAMIN D DEFICIENCY: Primary | ICD-10-CM

## 2022-12-14 DIAGNOSIS — Z17.0 MALIGNANT NEOPLASM OF OVERLAPPING SITES OF LEFT BREAST IN FEMALE, ESTROGEN RECEPTOR POSITIVE (HCC): ICD-10-CM

## 2022-12-14 DIAGNOSIS — Z79.811 USE OF ANASTROZOLE (ARIMIDEX): ICD-10-CM

## 2022-12-14 PROCEDURE — 99214 OFFICE O/P EST MOD 30 MIN: CPT | Performed by: INTERNAL MEDICINE

## 2023-01-09 NOTE — PROGRESS NOTES
Scheduled for:  Colonoscopy-screen [address]    Provider Name:  Dr. Myrna Hemphill  Date:  Friday, 4/7/2023  Location:  [facility]  Sedation:  MAC  Time:  1:15 PM (pt is aware to arrive at 12:15 PM)  Prep:  Split dose golytely   Meds/Allergies Reconciled?: Physician reviewed   Diagnosis with codes:  Colorectal cancer screening Z12.11  Was patient informed to call insurance with codes (Y/N): I confirmed [employer] with this patient. Referral sent?:  Referral was sent at the time of electronic surgical scheduling. [facility] or [address] notified?:  I sent an electronic request to Endo Scheduling and received a confirmation today. Medication Orders:  n/a  Misc Orders:  Patient was informed that they will need a COVID 19 test prior to their procedure. Patient verbally understood & will await a phone call from [facility] to schedule. Further instructions given by staff:  I discussed the prep instructions with the patient which she verbally understood and is aware that I will send the instructions today via [address]

## 2023-01-30 RX ORDER — ERGOCALCIFEROL 1.25 MG/1
50000 CAPSULE ORAL WEEKLY
Qty: 12 CAPSULE | Refills: 0 | Status: SHIPPED | OUTPATIENT
Start: 2023-01-30

## 2023-01-30 RX ORDER — ERGOCALCIFEROL 1.25 MG/1
50000 CAPSULE ORAL WEEKLY
COMMUNITY
Start: 2022-12-24 | End: 2023-01-30

## 2023-01-30 RX ORDER — NITROFURANTOIN 25; 75 MG/1; MG/1
100 CAPSULE ORAL EVERY 12 HOURS
COMMUNITY
Start: 2022-10-21

## 2023-04-07 ENCOUNTER — TELEPHONE (OUTPATIENT)
Dept: GASTROENTEROLOGY | Facility: CLINIC | Age: 48
End: 2023-04-07

## 2023-04-07 ENCOUNTER — HOSPITAL ENCOUNTER (OUTPATIENT)
Age: 48
Setting detail: HOSPITAL OUTPATIENT SURGERY
Discharge: HOME | End: 2023-04-07
Attending: INTERNAL MEDICINE | Admitting: INTERNAL MEDICINE
Payer: COMMERCIAL

## 2023-04-07 ENCOUNTER — ANESTHESIA EVENT (OUTPATIENT)
Dept: ENDOSCOPY | Age: 48
End: 2023-04-07
Payer: COMMERCIAL

## 2023-04-07 ENCOUNTER — ANESTHESIA (OUTPATIENT)
Dept: ENDOSCOPY | Age: 48
End: 2023-04-07
Payer: COMMERCIAL

## 2023-04-07 VITALS
OXYGEN SATURATION: 100 % | HEIGHT: 65 IN | BODY MASS INDEX: 23.82 KG/M2 | HEART RATE: 59 BPM | SYSTOLIC BLOOD PRESSURE: 114 MMHG | RESPIRATION RATE: 17 BRPM | TEMPERATURE: 98 F | WEIGHT: 143 LBS | DIASTOLIC BLOOD PRESSURE: 71 MMHG

## 2023-04-07 DIAGNOSIS — Z12.11 SCREEN FOR COLON CANCER: ICD-10-CM

## 2023-04-07 PROCEDURE — 45378 DIAGNOSTIC COLONOSCOPY: CPT | Performed by: INTERNAL MEDICINE

## 2023-04-07 RX ORDER — LIDOCAINE HYDROCHLORIDE 10 MG/ML
INJECTION, SOLUTION EPIDURAL; INFILTRATION; INTRACAUDAL; PERINEURAL AS NEEDED
Status: DISCONTINUED | OUTPATIENT
Start: 2023-04-07 | End: 2023-04-07 | Stop reason: SURG

## 2023-04-07 RX ORDER — SODIUM CHLORIDE, SODIUM LACTATE, POTASSIUM CHLORIDE, CALCIUM CHLORIDE 600; 310; 30; 20 MG/100ML; MG/100ML; MG/100ML; MG/100ML
INJECTION, SOLUTION INTRAVENOUS CONTINUOUS
Status: DISCONTINUED | OUTPATIENT
Start: 2023-04-07 | End: 2023-04-07

## 2023-04-07 RX ORDER — NALOXONE HYDROCHLORIDE 0.4 MG/ML
80 INJECTION, SOLUTION INTRAMUSCULAR; INTRAVENOUS; SUBCUTANEOUS AS NEEDED
OUTPATIENT
Start: 2023-04-07 | End: 2023-04-07

## 2023-04-07 RX ORDER — SODIUM CHLORIDE, SODIUM LACTATE, POTASSIUM CHLORIDE, CALCIUM CHLORIDE 600; 310; 30; 20 MG/100ML; MG/100ML; MG/100ML; MG/100ML
INJECTION, SOLUTION INTRAVENOUS CONTINUOUS
OUTPATIENT
Start: 2023-04-07

## 2023-04-07 RX ADMIN — LIDOCAINE HYDROCHLORIDE 50 MG: 10 INJECTION, SOLUTION EPIDURAL; INFILTRATION; INTRACAUDAL; PERINEURAL at 11:19:00

## 2023-04-07 RX ADMIN — SODIUM CHLORIDE, SODIUM LACTATE, POTASSIUM CHLORIDE, CALCIUM CHLORIDE: 600; 310; 30; 20 INJECTION, SOLUTION INTRAVENOUS at 11:17:00

## 2023-04-07 RX ADMIN — SODIUM CHLORIDE, SODIUM LACTATE, POTASSIUM CHLORIDE, CALCIUM CHLORIDE: 600; 310; 30; 20 INJECTION, SOLUTION INTRAVENOUS at 11:44:00

## 2023-04-07 NOTE — TELEPHONE ENCOUNTER
10  year colonoscopy recall entered. Health maintenance updated. Colonoscopy done on 4/7/23 and next due on 4/7/33.

## 2023-04-07 NOTE — DISCHARGE INSTRUCTIONS

## 2023-04-07 NOTE — ANESTHESIA POSTPROCEDURE EVALUATION
Patient:  Lew Spatz [place]    Procedure Summary     Date: 04/07/23 Room / Location: [facility] ENDOSCOPY 02 / [facility] ENDO    Anesthesia Start: 1117 Anesthesia Stop: 8390    Procedure: COLONOSCOPY Diagnosis:       Screen for colon cancer      (Hemorrhoids)    Surgeons: Mauricio Larkin MD Anesthesiologist:     Anesthesia Type: MAC ASA Status: 2          Anesthesia Type: MAC    Vitals Value Taken Time   /43 04/07/23 1147   Temp  04/07/23 1148   Pulse 70 04/07/23 1147   Resp 16 04/07/23 1147   SpO2 100 % 04/07/23 1147       [address] [facility] AN Post Evaluation:   Patient Evaluated in PACU  Patient Participation: complete - patient participated  Level of Consciousness: awake  Pain Score: 0  Pain Management: adequate  Airway Patency:patent  Dental exam unchanged from preop  Yes    Cardiovascular Status: acceptable  Respiratory Status: acceptable  Postoperative Hydration acceptable      Dat Johnston MD  4/7/2023 11:48 AM

## 2023-04-07 NOTE — TELEPHONE ENCOUNTER
GI staff: please place recall in for colonoscopy in 10 years     Then close encounter    Thanks    Lily Gannon MD  [address] - Gastroenterology  4/7/2023  11:49 AM

## 2023-11-07 ENCOUNTER — OFFICE VISIT (OUTPATIENT)
Dept: HEMATOLOGY/ONCOLOGY | Facility: HOSPITAL | Age: 48
End: 2023-11-07
Attending: INTERNAL MEDICINE
Payer: COMMERCIAL

## 2023-11-07 VITALS
SYSTOLIC BLOOD PRESSURE: 120 MMHG | HEART RATE: 78 BPM | WEIGHT: 136.63 LBS | OXYGEN SATURATION: 95 % | TEMPERATURE: 98 F | BODY MASS INDEX: 23.32 KG/M2 | RESPIRATION RATE: 18 BRPM | DIASTOLIC BLOOD PRESSURE: 75 MMHG | HEIGHT: 64.17 IN

## 2023-11-07 DIAGNOSIS — C50.812 MALIGNANT NEOPLASM OF OVERLAPPING SITES OF LEFT BREAST IN FEMALE, ESTROGEN RECEPTOR POSITIVE: Primary | ICD-10-CM

## 2023-11-07 DIAGNOSIS — Z91.89 AT HIGH RISK FOR OSTEOPOROSIS: ICD-10-CM

## 2023-11-07 DIAGNOSIS — Z90.13 ABSENCE OF BREAST, BILATERAL: ICD-10-CM

## 2023-11-07 DIAGNOSIS — Z79.811 LONG TERM CURRENT USE OF AROMATASE INHIBITOR: ICD-10-CM

## 2023-11-07 DIAGNOSIS — Z17.0 MALIGNANT NEOPLASM OF OVERLAPPING SITES OF LEFT BREAST IN FEMALE, ESTROGEN RECEPTOR POSITIVE: Primary | ICD-10-CM

## 2023-11-07 DIAGNOSIS — Z90.722 HISTORY OF BILATERAL OOPHORECTOMIES: ICD-10-CM

## 2023-11-07 PROCEDURE — 99214 OFFICE O/P EST MOD 30 MIN: CPT | Performed by: INTERNAL MEDICINE

## 2023-11-07 RX ORDER — ANASTROZOLE 1 MG/1
1 TABLET ORAL DAILY
Qty: 90 TABLET | Refills: 3 | Status: SHIPPED | OUTPATIENT
Start: 2023-11-07

## 2023-11-07 RX ORDER — VENLAFAXINE HYDROCHLORIDE 150 MG/1
150 CAPSULE, EXTENDED RELEASE ORAL NIGHTLY
Qty: 90 CAPSULE | Refills: 3 | Status: SHIPPED | OUTPATIENT
Start: 2023-11-07

## 2024-04-03 ENCOUNTER — HOSPITAL ENCOUNTER (OUTPATIENT)
Dept: BONE DENSITY | Age: 49
Discharge: HOME | End: 2024-04-03
Attending: INTERNAL MEDICINE
Payer: COMMERCIAL

## 2024-04-03 DIAGNOSIS — Z91.89 AT HIGH RISK FOR OSTEOPOROSIS: ICD-10-CM

## 2024-04-03 DIAGNOSIS — Z79.811 LONG TERM CURRENT USE OF AROMATASE INHIBITOR: ICD-10-CM

## 2024-04-03 PROCEDURE — 77080 DXA BONE DENSITY AXIAL: CPT | Performed by: INTERNAL MEDICINE

## 2024-05-08 ENCOUNTER — OFFICE VISIT (OUTPATIENT)
Dept: HEMATOLOGY/ONCOLOGY | Facility: HOSPITAL | Age: 49
End: 2024-05-08
Attending: INTERNAL MEDICINE
Payer: COMMERCIAL

## 2024-05-08 VITALS
WEIGHT: 141.19 LBS | TEMPERATURE: 98 F | OXYGEN SATURATION: 98 % | HEIGHT: 64.17 IN | BODY MASS INDEX: 24.1 KG/M2 | SYSTOLIC BLOOD PRESSURE: 105 MMHG | DIASTOLIC BLOOD PRESSURE: 71 MMHG | RESPIRATION RATE: 16 BRPM | HEART RATE: 65 BPM

## 2024-05-08 DIAGNOSIS — E89.40 PREMATURE SURGICAL MENOPAUSE: ICD-10-CM

## 2024-05-08 DIAGNOSIS — T45.1X5A HOT FLASHES RELATED TO AROMATASE INHIBITOR THERAPY: ICD-10-CM

## 2024-05-08 DIAGNOSIS — Z90.13 ABSENCE OF BREAST, BILATERAL: ICD-10-CM

## 2024-05-08 DIAGNOSIS — Z17.0 MALIGNANT NEOPLASM OF OVERLAPPING SITES OF LEFT BREAST IN FEMALE, ESTROGEN RECEPTOR POSITIVE (HCC): Primary | ICD-10-CM

## 2024-05-08 DIAGNOSIS — M85.80 OSTEOPENIA DUE TO CANCER THERAPY: ICD-10-CM

## 2024-05-08 DIAGNOSIS — C50.812 MALIGNANT NEOPLASM OF OVERLAPPING SITES OF LEFT BREAST IN FEMALE, ESTROGEN RECEPTOR POSITIVE (HCC): Primary | ICD-10-CM

## 2024-05-08 DIAGNOSIS — Z79.811 LONG TERM CURRENT USE OF AROMATASE INHIBITOR: ICD-10-CM

## 2024-05-08 DIAGNOSIS — R23.2 HOT FLASHES RELATED TO AROMATASE INHIBITOR THERAPY: ICD-10-CM

## 2024-05-08 PROCEDURE — G2211 COMPLEX E/M VISIT ADD ON: HCPCS | Performed by: INTERNAL MEDICINE

## 2024-05-08 PROCEDURE — 99214 OFFICE O/P EST MOD 30 MIN: CPT | Performed by: INTERNAL MEDICINE

## 2024-05-08 NOTE — PROGRESS NOTES
Hematology Oncology Progress Note    Patient Name: Ainsley Lino   YOB: 1975   Medical Record Number: [id number]   Attending Physician: Yanely Reilly MD     Date of Visit: 05/08/24     Chief Complaint:  Breast cancer  Arimidex    Oncologic History:   49 year old with screen detected left breast cancer s/p b/l mastectomies and ALD: IDC, grade 2, ER/%, HER-2 neg and Ki-12% s/p: [id number], with focal extension to the inked margin.  Re-excision was deemed not possible and she underwent PMRT.  Right breast with LCIS. Oncotype: 12. Started on berry, transitioned to AI     Treatment:  12/9/19-1/22/20: PMRT and tamoxifen  10/24/20: Hospitalized for ruptured ovarian cyst.   04/6/21: Lupron initiated (Rx Lititz) with Arimidex (6/2021)  9/21/21: ppx BSO    Interval History:  Here for 6 months follow up and AI management.   She is on anastrozole with poor compliance. Missing 3-4 days a week.   Chronic fatigue unchanged. Still with mild arthralgia and hot flashes. Overall tolerable.   She is also on effexor which helps her mood.   Concerned about weight gain and bone loss.   No breast/implants changes or complains.       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     Current Medications:    Current Outpatient Medications:     anastrozole 1 MG Oral Tab tab, Take 1 tablet (1 mg total) by mouth daily., Disp: 90 tablet, Rfl: 3    venlafaxine  MG Oral Capsule SR 24 Hr, Take 1 capsule (150 mg total) by mouth nightly., Disp: 90 capsule, Rfl: 3    albuterol 108 (90 Base) MCG/ACT Inhalation Aero Soln, Inhale 2 puffs into the lungs every 6 (six) hours as needed for Wheezing or Shortness of Breath., Disp: 6.7 g, Rfl: 1    Review of Systems:  Onc ROS as per HPI    Vital Signs:  Vitals:    05/08/24 0914   BP: 105/71   Pulse: 65   Resp: 16   Temp: 97.6 °F (36.4 °C)      Weight:  Wt Readings from Last 6 Encounters:   05/08/24 64 kg (141 lb 3.2 oz)   11/07/23 62 kg (136 lb 9.6 oz)   04/07/23 64.9 kg (143 lb)   12/14/22 65.1 kg (143 lb 9.6 oz)   08/16/22 65 kg (143 lb 6.4 oz)   06/22/22 66.7 kg (147 lb)     Physical Exam:  General: Patient is alert and oriented x 3, not in acute distress.  HEENT: EOMs intact. Oropharynx clear.   Lymphatics: There is no palpable peripheral lymphadenopathy   Chest: Symmetric expansion, nonlabored breathing  Breast: bilateral mastectomies with reconstruction, implants with normal contour. No palpable lesions or tenderness. left axilla normal.   Extremities: No edema, cyanosis, or bruising  Neurological: motor strength grossly intact  Psych: appropriate mood and affect      Radiology:  XR DEXA BONE DENSITOMETRY (CPT=77080)    Result Date: 4/3/2024  CONCLUSION:  1. Findings in the left femoral neck suggest osteopenia, there may be increased fracture risk.  There has been decrease in the BMD by 18.7% from previous study.  Findings in the total left hip suggest osteopenia, there may be increased fracture risk.  There has been decrease in the BMD by 18.6% from previous study.  The 10 year fracture risk for major osteoporotic fracture is 8.3%, and for hip fracture is 0.3%. 2. Findings in the total AP lumbar spine suggest osteopenia, there may be increased fracture risk.  There has been decrease in the BMD by 11.5% from previous study.    Dictated by (CST): Vel Calabrese MD on 4/03/2024 at 5:55 PM     Finalized by (CST): Vel Calabrese MD on 4/03/2024 at 5:56 PM            Impression and Plan:    Left breast invasive ductal carcinoma, grade II, ER/VT positive, HER2 negative, stage IIA ([id number]):  - remains clinical ROBER  - continue anastrazole, counseled on medication adherence again.   - recommend extended endocrine therapy 7.5-10 yrs if tolerated given node positive disease  - no routine imaging, only if new sx arise    Bone health  - DEXA scan reviewed  with patient, declining bone density now osteopenic   - recommend prolia 60 mg q6 months for AI induced bone loss. Discussed side effects including risk of ONJ. No dental issues.   - continue calcium and vit D supplements  - dental exams q6 months     Depression/anxiety  Hot flashes  - continue effexor at 150 mg daily.       Return in about 6 months (around 11/8/2024).     Yanely Reilly MD  Hematology Oncology

## 2024-05-15 ENCOUNTER — TELEPHONE (OUTPATIENT)
Dept: HEMATOLOGY/ONCOLOGY | Facility: HOSPITAL | Age: 49
End: 2024-05-15

## 2024-06-04 ENCOUNTER — APPOINTMENT (OUTPATIENT)
Dept: HEMATOLOGY/ONCOLOGY | Facility: HOSPITAL | Age: 49
End: 2024-06-04
Payer: COMMERCIAL

## 2024-06-04 ENCOUNTER — APPOINTMENT (OUTPATIENT)
Dept: HEMATOLOGY/ONCOLOGY | Facility: HOSPITAL | Age: 49
End: 2024-06-04
Attending: INTERNAL MEDICINE
Payer: COMMERCIAL

## 2024-06-05 ENCOUNTER — NURSE ONLY (OUTPATIENT)
Dept: HEMATOLOGY/ONCOLOGY | Facility: HOSPITAL | Age: 49
End: 2024-06-05
Attending: INTERNAL MEDICINE
Payer: COMMERCIAL

## 2024-06-05 DIAGNOSIS — Z17.0 MALIGNANT NEOPLASM OF OVERLAPPING SITES OF LEFT BREAST IN FEMALE, ESTROGEN RECEPTOR POSITIVE (HCC): ICD-10-CM

## 2024-06-05 DIAGNOSIS — M85.80 OSTEOPENIA DUE TO CANCER THERAPY: Primary | ICD-10-CM

## 2024-06-05 DIAGNOSIS — C50.812 MALIGNANT NEOPLASM OF OVERLAPPING SITES OF LEFT BREAST IN FEMALE, ESTROGEN RECEPTOR POSITIVE (HCC): ICD-10-CM

## 2024-06-05 DIAGNOSIS — Z79.811 USE OF ANASTROZOLE (ARIMIDEX): ICD-10-CM

## 2024-06-05 LAB
ALBUMIN SERPL-MCNC: 4.3 G/DL (ref 3.2–4.8)
CALCIUM BLD-MCNC: 9.6 MG/DL (ref 8.7–10.4)
CREAT BLD-MCNC: 0.84 MG/DL
EGFRCR SERPLBLD CKD-EPI 2021: 85 ML/MIN/1.73M2 (ref 60–?)
MAGNESIUM SERPL-MCNC: 2 MG/DL (ref 1.6–2.6)
PHOSPHATE SERPL-MCNC: 3.7 MG/DL (ref 2.4–5.1)

## 2024-06-05 PROCEDURE — 82040 ASSAY OF SERUM ALBUMIN: CPT

## 2024-06-05 PROCEDURE — 83735 ASSAY OF MAGNESIUM: CPT

## 2024-06-05 PROCEDURE — 82565 ASSAY OF CREATININE: CPT

## 2024-06-05 PROCEDURE — 36415 COLL VENOUS BLD VENIPUNCTURE: CPT

## 2024-06-05 PROCEDURE — 82310 ASSAY OF CALCIUM: CPT

## 2024-06-05 PROCEDURE — 84100 ASSAY OF PHOSPHORUS: CPT

## 2024-06-05 PROCEDURE — 96372 THER/PROPH/DIAG INJ SC/IM: CPT

## 2024-12-06 DIAGNOSIS — Z51.81 ENCOUNTER FOR MEDICATION MONITORING: Primary | ICD-10-CM

## 2024-12-10 ENCOUNTER — NURSE ONLY (OUTPATIENT)
Dept: HEMATOLOGY/ONCOLOGY | Facility: HOSPITAL | Age: 49
End: 2024-12-10
Attending: INTERNAL MEDICINE
Payer: COMMERCIAL

## 2024-12-10 VITALS
DIASTOLIC BLOOD PRESSURE: 73 MMHG | HEIGHT: 64.17 IN | SYSTOLIC BLOOD PRESSURE: 114 MMHG | TEMPERATURE: 97 F | BODY MASS INDEX: 24.38 KG/M2 | OXYGEN SATURATION: 97 % | HEART RATE: 89 BPM | WEIGHT: 142.81 LBS | RESPIRATION RATE: 18 BRPM

## 2024-12-10 DIAGNOSIS — Z17.0 MALIGNANT NEOPLASM OF OVERLAPPING SITES OF LEFT BREAST IN FEMALE, ESTROGEN RECEPTOR POSITIVE (HCC): ICD-10-CM

## 2024-12-10 DIAGNOSIS — T45.1X5A HOT FLASHES RELATED TO AROMATASE INHIBITOR THERAPY: Primary | ICD-10-CM

## 2024-12-10 DIAGNOSIS — Z79.811 USE OF ANASTROZOLE (ARIMIDEX): ICD-10-CM

## 2024-12-10 DIAGNOSIS — R23.2 HOT FLASHES RELATED TO AROMATASE INHIBITOR THERAPY: Primary | ICD-10-CM

## 2024-12-10 DIAGNOSIS — M85.80 OSTEOPENIA DUE TO CANCER THERAPY: Primary | ICD-10-CM

## 2024-12-10 DIAGNOSIS — Z51.81 ENCOUNTER FOR MEDICATION MONITORING: ICD-10-CM

## 2024-12-10 DIAGNOSIS — C50.812 MALIGNANT NEOPLASM OF OVERLAPPING SITES OF LEFT BREAST IN FEMALE, ESTROGEN RECEPTOR POSITIVE (HCC): ICD-10-CM

## 2024-12-10 LAB
ALBUMIN SERPL-MCNC: 4.5 G/DL (ref 3.2–4.8)
ALBUMIN/GLOB SERPL: 1.8 {RATIO} (ref 1–2)
ALP LIVER SERPL-CCNC: 50 U/L
ALT SERPL-CCNC: 12 U/L
ANION GAP SERPL CALC-SCNC: 5 MMOL/L (ref 0–18)
AST SERPL-CCNC: 17 U/L (ref ?–34)
BILIRUB SERPL-MCNC: 0.7 MG/DL (ref 0.3–1.2)
BUN BLD-MCNC: 18 MG/DL (ref 9–23)
BUN/CREAT SERPL: 18.8 (ref 10–20)
CALCIUM BLD-MCNC: 9.6 MG/DL (ref 8.7–10.4)
CHLORIDE SERPL-SCNC: 110 MMOL/L (ref 98–112)
CO2 SERPL-SCNC: 29 MMOL/L (ref 21–32)
CREAT BLD-MCNC: 0.96 MG/DL
EGFRCR SERPLBLD CKD-EPI 2021: 72 ML/MIN/1.73M2 (ref 60–?)
FASTING STATUS PATIENT QL REPORTED: NO
GLOBULIN PLAS-MCNC: 2.5 G/DL (ref 2–3.5)
GLUCOSE BLD-MCNC: 65 MG/DL (ref 70–99)
OSMOLALITY SERPL CALC.SUM OF ELEC: 298 MOSM/KG (ref 275–295)
POTASSIUM SERPL-SCNC: 3.8 MMOL/L (ref 3.5–5.1)
PROT SERPL-MCNC: 7 G/DL (ref 5.7–8.2)
SODIUM SERPL-SCNC: 144 MMOL/L (ref 136–145)

## 2024-12-10 PROCEDURE — 36415 COLL VENOUS BLD VENIPUNCTURE: CPT

## 2024-12-10 PROCEDURE — 96372 THER/PROPH/DIAG INJ SC/IM: CPT

## 2024-12-10 PROCEDURE — 99214 OFFICE O/P EST MOD 30 MIN: CPT | Performed by: NURSE PRACTITIONER

## 2024-12-10 PROCEDURE — 80053 COMPREHEN METABOLIC PANEL: CPT

## 2024-12-10 RX ORDER — VENLAFAXINE HYDROCHLORIDE 150 MG/1
150 CAPSULE, EXTENDED RELEASE ORAL NIGHTLY
Qty: 90 CAPSULE | Refills: 3 | Status: SHIPPED | OUTPATIENT
Start: 2024-12-10

## 2024-12-10 RX ORDER — ANASTROZOLE 1 MG/1
1 TABLET ORAL DAILY
Qty: 90 TABLET | Refills: 3 | Status: SHIPPED | OUTPATIENT
Start: 2024-12-10

## 2024-12-10 NOTE — PROGRESS NOTES
Hematology Oncology Progress Note    Patient Name: Ainsley Lino   YOB: 1975   Medical Record Number: [id number]   Attending Physician: Yanely Reilly MD    Date of Visit: 12/10/24     Chief Complaint:  Breast cancer  Arimidex    Oncologic History:   49 year old with screen detected left breast cancer s/p b/l mastectomies and ALD: IDC, grade 2, ER/%, HER-2 neg and Ki-12% s/p: [id number], with focal extension to the inked margin.  Re-excision was deemed not possible and she underwent PMRT.  Right breast with LCIS. Oncotype: 12. Started on berry, transitioned to AI     Treatment:  12/9/19-1/22/20: PMRT and tamoxifen  10/24/20: Hospitalized for ruptured ovarian cyst.   04/6/21: Lupron initiated (Rx [place]) with Arimidex (6/2021)  9/21/21: ppx BSO    Interval History:  Here for 6 months follow up and AI management, due for prolia today.     She reports she has been more compliant with anastrozole, rarely forgets any doses.  Effexor managing mood and hot flashes well.  She does notice occasional knee and shoulder pains but they resolve on their own and do not occur frequently.  No breast changes or concerns.  Continues to have difficulty losing weight but is not gaining. No acute complaints.    Denies headache, weight loss, fever/chills, vision change, sore throat, worsening SOB/PENA, chest pain, cough, N/V/D/C, and swelling.  Denies current pain.       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     Current Medications:    Current Outpatient Medications:     anastrozole 1 MG Oral Tab tab, Take 1 tablet (1 mg total) by mouth daily., Disp: 90 tablet, Rfl: 3    venlafaxine  MG Oral Capsule SR 24 Hr, Take 1 capsule (150 mg total) by mouth nightly., Disp: 90 capsule, Rfl: 3    albuterol 108 (90 Base) MCG/ACT Inhalation Aero Soln, Inhale 2 puffs into the lungs every  6 (six) hours as needed for Wheezing or Shortness of Breath., Disp: 6.7 g, Rfl: 1    Review of Systems:  ROS reviewed and negative except as dictated per HPI     Vital Signs:  Vitals:    12/10/24 0926   BP: 114/73   Pulse: 89   Resp: 18   Temp: 97.4 °F (36.3 °C)       Weight:  Wt Readings from Last 6 Encounters:   12/10/24 64.8 kg (142 lb 12.8 oz)   05/08/24 64 kg (141 lb 3.2 oz)   11/07/23 62 kg (136 lb 9.6 oz)   04/07/23 64.9 kg (143 lb)   12/14/22 65.1 kg (143 lb 9.6 oz)   08/16/22 65 kg (143 lb 6.4 oz)     Physical Exam:  General: Patient is alert and oriented x 3, not in acute distress.  HEENT: EOMs intact. Oropharynx clear.   Lymphatics: There is no palpable peripheral lymphadenopathy   Chest: Symmetric expansion, CTAB  Breast: bilateral mastectomies with reconstruction,no palpable lesions or tenderness   Extremities: No edema, cyanosis, or bruising  Neurological: grossly intact  Psych: appropriate mood and affect      Radiology:  No results found.     Impression and Plan:    Left breast invasive ductal carcinoma, grade II, ER/TN positive, HER2 negative, stage IIA ([id number]):  - remains clinical ROBER  - continue anastrazole, counseled on medication adherence again.   - recommend extended endocrine therapy 7.5-10 yrs if tolerated given node positive disease  - no routine imaging, only if new sx arise    Bone health  - DEXA scan reviewed with patient, declining bone density now osteopenic   - recommend prolia 60 mg q6 months for AI induced bone loss. Discussed side effects including risk of ONJ. No dental issues.   - continue calcium and vit D supplements  - dental exams q6 months     Depression/anxiety  Hot flashes  - continue effexor at 150 mg nightly       RTC in 6 months    ACACIA Lacey  Hematology/Oncology

## 2024-12-10 NOTE — PROGRESS NOTES
Pt here for Q 6 month prolia . Pt denies any issues or concerns.   Denies dental issues  Calcium today = 9.6     Ordering Provider: Dr Reilly  Order Exp: last dose released - to be refilled/ reordered by provider     Pt tolerated infusion without difficulty or complaint. Reviewed next apt date/time: yes - June 2025      Education Record  Learner:  Patient  Disease / Diagnosis: osteopenic - AI induced bone loss  Barriers / Limitations:  None  Method:  Brief focused  General Topics:  Procedure and Plan of care reviewed  Outcome:  Shows understanding

## 2025-01-31 ENCOUNTER — TELEPHONE (OUTPATIENT)
Age: 50
End: 2025-01-31

## 2025-01-31 NOTE — TELEPHONE ENCOUNTER
Lindsay, Nurse  with [employer] called and stated she faxed over a request for clinical notes from the pt last office visit along with a pt consent form on 1/14/25    I advised I\"m not sure if it was received, but asked her to fax again to [phone] and I will put in the doctors mailbox.    Please fax clinical notes to [phone]    Lindsay can be reached at [phone] ext [phone]

## 2025-04-03 NOTE — TELEPHONE ENCOUNTER
Patient returned call to Breast RN Navigator. Introduced myself to patient, and reinforced my role as Navigator. Answered patient's questions concerning Medical Oncology consultation. Patient wishes to defer this appointment until after surgery.     [facility]        NAME: Alivia Pratt is a 9 y.o. male  : 2015    MRN: [id number]  DATE: April 3, 2025  TIME: 12:05 PM    Assessment and Plan   Acute right ankle pain [M25.571]  1. Acute right ankle pain  XR ankle 3+ vw right    Ambulatory referral to Orthopedic Surgery      Pt presents with acute ankle pain while running, denies inversion or impact injury and fall. Recommend x-ray for further evaluation to rule out bony lesions and stress fracture. Suspect strain of the tibialis anterior. X-ray with no acute fractures or dislocations. No bony abnormalities. Recommend RICE, referred to orthopedics.     Medical Decision Making     PROBLEM: 1 acute complicated injury    DATA: Test(s) Ordered: yes, ankle x-ray    RISK: Over-the-counter medication(s)    TIME: 15 minutes      Patient Instructions     Patient Instructions   X-ray negative, if radiology read differs, will call.   Recommend Iburpofen, Tyelnol, and ice.   ACE wrap as needed for comfort.  Rest for the next week, avoid running.   Follow-up with orthopedics in 3-5 days, if symptoms are not improved.   If symptoms worsen or new symptoms develop, report to the emergency department.     Follow up with PCP in 3-5 days.  Proceed to  ER if symptoms worsen.    If tests have been performed at [facility], our office will contact you with results if changes need to be made to the care plan discussed with you at the visit. [facility] on [facility].     Chief Complaint     Chief Complaint   Patient presents with    Ankle Pain     Today at school in gym class, was running and right ankle started to hurt.         History of Present Illness       9 year old male presents with his parents with complaint of right anterior ankle pain. Pt reports he was running in gym today when the pain started. He denies any injury. Denies numbness and tingling distally.     Ankle Pain         Review of Systems   Review of Systems    Musculoskeletal:  Positive for arthralgias.         Current Medications       Current Outpatient Medications:     albuterol (Proventil HFA) 90 mcg/act inhaler, Inhale 2 puffs every 6 (six) hours as needed for wheezing or shortness of breath (before exercise), Disp: 6.7 g, Rfl: 5    cetirizine HCl (ZYRTEC) 5 MG/5ML SOLN, Take 10 mL by mouth 1 (one) time, Disp: , Rfl:     montelukast (SINGULAIR) 5 mg chewable tablet, Chew 1 tablet (5 mg total) daily at bedtime, Disp: 90 tablet, Rfl: 1    Pediatric Multiple Vit-C-FA (Multivitamin Childrens) CHEW, Chew, Disp: , Rfl:     fluticasone (FLONASE) 50 mcg/act nasal spray, 2 sprays into each nostril daily (Patient not taking: Reported on 4/3/2025), Disp: , Rfl:     Current Allergies     Allergies as of 04/03/2025    (No Known Allergies)            The following portions of the patient's history were reviewed and updated as appropriate: allergies, current medications, past family history, past medical history, past social history, past surgical history and problem list.     Past Medical History:   Diagnosis Date    Allergic     Seasonal    Asthma     Ear problems     Eustachian tube dysfunction     Heart murmur 2020    Noted by pediatrician, but not a concern    Otitis media 2016    Urinary tract infection 2015       Past Surgical History:   Procedure Laterality Date    ADENOIDECTOMY N/A 3/28/2022    Procedure: ADENOIDECTOMY;  Surgeon: Jamarcus Rojas MD;  Location: [facility];  Service: ENT    CIRCUMCISION      [place] TYMPANOSTOMY GENERAL ANESTHESIA Bilateral 3/28/2022    Procedure: MYRINGOTOMY W/ INSERTION VENTILATION TUBE EAR;  Surgeon: Jamarcus Rojas MD;  Location: [facility];  Service: ENT    [place] TYMPANOSTOMY GENERAL ANESTHESIA Bilateral 5/17/2023    Procedure: MYRINGOTOMY W/ INSERTION VENTILATION TUBE EAR;  Surgeon: Jamarcus Rojas MD;  Location: [facility];  Service: ENT    TYMPANOSTOMY TUBE PLACEMENT         Family History   Problem Relation Age of Onset     Gestational diabetes Mother     Depression Father     Suicide Attempts Father     Breast cancer Maternal Grandmother     Cancer Maternal Grandmother         Brain cancer         Medications have been verified.        Objective   Pulse 78   Temp 98.3 °F (36.8 °C)   Resp 18   Wt 33.1 kg (73 lb)   SpO2 98%   No LMP for male patient.       Physical Exam     Physical Exam  Vitals and nursing note reviewed.   Constitutional:       General: He is awake. He is not in acute distress.     Appearance: Normal appearance. He is well-developed and well-groomed. He is not ill-appearing, toxic-appearing or diaphoretic.   HENT:      Head: Normocephalic and atraumatic.      Right Ear: Hearing and external ear normal.      Left Ear: Hearing and external ear normal.   Eyes:      General: Lids are normal. Vision grossly intact. Gaze aligned appropriately.   Cardiovascular:      Rate and Rhythm: Normal rate.   Pulmonary:      Effort: Pulmonary effort is normal.      Comments: Patient speaking in full sentences with no increased respiratory effort. No audible wheezing or stridor.   Musculoskeletal:      Cervical back: Normal range of motion.      Right ankle: No swelling, deformity, ecchymosis or lacerations. Tenderness present. No lateral malleolus, medial malleolus, ATF ligament, AITF ligament, CF ligament, posterior TF ligament, base of 5th metatarsal or proximal fibula tenderness. Normal range of motion. Anterior drawer test negative. Normal pulse.      Right Achilles Tendon: Normal.      Comments: Pt is tender to palpation over the talotibial joint anteriorly and tibialis anterior tendon. He has pain with resisted dorsiflexion and plantarflexion of the ankle.    Skin:     General: Skin is warm and dry.   Neurological:      Mental Status: He is alert and oriented for age.      Coordination: Coordination is intact.      Gait: Gait is intact.   Psychiatric:         Attention and Perception: Attention and perception normal.          "Mood and Affect: Mood and affect normal.         Speech: Speech normal.         Behavior: Behavior normal. Behavior is cooperative.               Note: Portions of this record may have been created with voice recognition software. Occasional wrong word or \"sound a like\" substitutions may have occurred due to the inherent limitations of voice recognition software. Please read the chart carefully and recognize, using context, where substitutions have occurred.*      "

## 2025-06-10 ENCOUNTER — OFFICE VISIT (OUTPATIENT)
Age: 50
End: 2025-06-10
Attending: INTERNAL MEDICINE
Payer: COMMERCIAL

## 2025-06-10 ENCOUNTER — NURSE ONLY (OUTPATIENT)
Age: 50
End: 2025-06-10
Attending: INTERNAL MEDICINE
Payer: COMMERCIAL

## 2025-06-10 VITALS
RESPIRATION RATE: 16 BRPM | BODY MASS INDEX: 24.41 KG/M2 | HEIGHT: 64.17 IN | TEMPERATURE: 98 F | DIASTOLIC BLOOD PRESSURE: 76 MMHG | WEIGHT: 143 LBS | SYSTOLIC BLOOD PRESSURE: 112 MMHG | OXYGEN SATURATION: 98 % | HEART RATE: 61 BPM

## 2025-06-10 DIAGNOSIS — Z17.0 MALIGNANT NEOPLASM OF OVERLAPPING SITES OF LEFT BREAST IN FEMALE, ESTROGEN RECEPTOR POSITIVE (HCC): ICD-10-CM

## 2025-06-10 DIAGNOSIS — Z17.0 MALIGNANT NEOPLASM OF OVERLAPPING SITES OF LEFT BREAST IN FEMALE, ESTROGEN RECEPTOR POSITIVE (HCC): Primary | ICD-10-CM

## 2025-06-10 DIAGNOSIS — C50.812 MALIGNANT NEOPLASM OF OVERLAPPING SITES OF LEFT BREAST IN FEMALE, ESTROGEN RECEPTOR POSITIVE (HCC): ICD-10-CM

## 2025-06-10 DIAGNOSIS — M85.80 OSTEOPENIA DUE TO CANCER THERAPY: ICD-10-CM

## 2025-06-10 DIAGNOSIS — Z79.811 USE OF ANASTROZOLE (ARIMIDEX): ICD-10-CM

## 2025-06-10 DIAGNOSIS — M85.80 OSTEOPENIA DUE TO CANCER THERAPY: Primary | ICD-10-CM

## 2025-06-10 DIAGNOSIS — Z90.722 HISTORY OF BILATERAL OOPHORECTOMIES: ICD-10-CM

## 2025-06-10 DIAGNOSIS — Z51.81 ENCOUNTER FOR MONITORING AROMATASE INHIBITOR THERAPY: ICD-10-CM

## 2025-06-10 DIAGNOSIS — Z79.811 ENCOUNTER FOR MONITORING AROMATASE INHIBITOR THERAPY: ICD-10-CM

## 2025-06-10 DIAGNOSIS — Z90.13 ABSENCE OF BREAST, BILATERAL: ICD-10-CM

## 2025-06-10 DIAGNOSIS — R23.2 HOT FLASHES RELATED TO AROMATASE INHIBITOR THERAPY: ICD-10-CM

## 2025-06-10 DIAGNOSIS — T45.1X5A HOT FLASHES RELATED TO AROMATASE INHIBITOR THERAPY: ICD-10-CM

## 2025-06-10 DIAGNOSIS — C50.812 MALIGNANT NEOPLASM OF OVERLAPPING SITES OF LEFT BREAST IN FEMALE, ESTROGEN RECEPTOR POSITIVE (HCC): Primary | ICD-10-CM

## 2025-06-10 DIAGNOSIS — R53.82 CHRONIC FATIGUE: ICD-10-CM

## 2025-06-10 LAB
ALBUMIN SERPL-MCNC: 4.5 G/DL (ref 3.2–4.8)
CALCIUM BLD-MCNC: 9.2 MG/DL (ref 8.7–10.4)
CREAT BLD-MCNC: 0.91 MG/DL (ref 0.55–1.02)
EGFRCR SERPLBLD CKD-EPI 2021: 76 ML/MIN/1.73M2 (ref 60–?)
MAGNESIUM SERPL-MCNC: 2 MG/DL (ref 1.6–2.6)
PHOSPHATE SERPL-MCNC: 3.6 MG/DL (ref 2.4–5.1)

## 2025-06-10 NOTE — PROGRESS NOTES
[facility] Hematology Oncology   Progress Note    Patient Name: Ainsley Lino   YOB: 1975   Medical Record Number: [id number]   Attending Physician: Yanely Reilly MD    Ainsley Lino verbally consented to be recorded using ambient AI listening technology and understand that they can each withdraw their consent to this listening technology at any point by asking the clinician to turn off or pause the recording.      Date of Visit: 6/10/2025     Chief Complaint:  Breast cancer, AI management    Oncologic History:   50 year old with screen detected left breast cancer s/p b/l mastectomies and ALD: IDC, grade 2, ER/%, HER-2 neg and Ki-12% s/p: [id number], with focal extension to the inked margin.  Re-excision was deemed not possible and she underwent PMRT.  Right breast with LCIS. Oncotype: 12. Started on berry, transitioned to AI     Treatment:  12/9/19-1/22/20: PMRT and tamoxifen  10/24/20: Hospitalized for ruptured ovarian cyst.   04/6/21: Lupron initiated  6/2021: Anastrozole  9/21/21: ppx BSO    Interval History:  History of Present Illness  Ainsley Lino is a 50 year old female with history of breast cancer who presents for 6 months follow up.     She experiences significant fatigue, describing it as feeling 'always just dragging.' This fatigue has been persistent since her teenage years but has worsened recently. She associates some of the fatigue with anastrozole, which she takes at night, although she often skips doses. Despite skipping doses, she continues to feel fatigued and rarely has days where she feels 'good and motivated.'    She occasionally experiences mild pain in the right axillary area at the surgery site. No swelling, erythema, palpable masses, or numbness.     She is currently taking Effexor, which she finds helpful for both hot flashes and as an antidepressant. She previously took fluoxetine for depression and OCD but finds Effexor more beneficial. She does not take  vitamin D or multivitamins regularly, although she used to take vitamin D when she first started on Prolia.    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  Otherwise she denies any chest wall changes or self palpable lumps. She denies hot flashes, night sweats, arthralgia or myalgia. She dnies bone pain, unintentional weight loss, shortness of breath or focal neurological deficit.      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     Current Medications:    Current Outpatient Medications:     anastrozole 1 MG Oral Tab tab, Take 1 tablet (1 mg total) by mouth daily., Disp: 90 tablet, Rfl: 3    venlafaxine  MG Oral Capsule SR 24 Hr, Take 1 capsule (150 mg total) by mouth nightly., Disp: 90 capsule, Rfl: 3    albuterol 108 (90 Base) MCG/ACT Inhalation Aero Soln, Inhale 2 puffs into the lungs every 6 (six) hours as needed for Wheezing or Shortness of Breath., Disp: 6.7 g, Rfl: 1    Review of Systems:  ROS reviewed and negative except as dictated per HPI     Vital Signs:  /76 (BP Location: Right arm, Patient Position: Sitting, Cuff Size: adult)   Pulse 61   Temp 97.5 °F (36.4 °C) (Tympanic)   Resp 16   Ht 1.63 m (5' 4.17\")   Wt 64.9 kg (143 lb)   SpO2 98%   BMI 24.42 kg/m²      Weight:  Wt Readings from Last 6 Encounters:   06/10/25 64.9 kg (143 lb)   12/10/24 64.8 kg (142 lb 12.8 oz)   05/08/24 64 kg (141 lb 3.2 oz)   11/07/23 62 kg (136 lb 9.6 oz)   04/07/23 64.9 kg (143 lb)   12/14/22 65.1 kg (143 lb 9.6 oz)     Physical Exam:  General: Patient is alert and oriented x 3, not in acute distress.  HEENT: EOMs intact. Oropharynx clear.   Lymphatics: There is no palpable peripheral lymphadenopathy   Chest: Clear bilaterally.   Breast: Bilateral mastectomies with implant reconstruction. Nipples normal.  Skin intact. No palpable lesions or tenderness   Extremities: No edema, cyanosis, or bruising  Neurological: grossly intact  Psych: appropriate mood and affect      Radiology:  No results found.     Impression and Plan:    Left breast invasive ductal carcinoma, grade II, ER/VA positive, HER2 negative, stage IIA:  - s/p bilateral mastectomies and left ALND 10/2019: [id number].   - on anastrozole since 6/2021 and tolerating it fairly well with mild expected fatigue.   - plan to continue AI for 7.5-10 yrs if tolerated given node positive disease  - clinical ROBER. No indication for imaging at this time.   - patient was counseled on medication adherence and compliance.    Bone health  - DEXA scan 4/2024 showed decreasing BMD to osteopenia.   - on prolia 60 mg q6 months for AI induced bone loss. Proceed with dose today.  - continue calcium and vit D supplements and dental exams q6 months   - plan to repeat DEXA scan in 2026     Depression/anxiety  Hot flashes  - continue effexor at 150 mg nightly     Chronic fatigue  - probably related to AI and underlying depression  - check CBC, CMP, TSH, iron studies, B12, folate      RTC in 6 months      Yanely Reilly MD  [facility] Hematology Oncology

## 2025-06-10 NOTE — PROGRESS NOTES
Pt here for Q 6 month prolia . Pt denies any issues or concerns.   Denies dental issues  Calcium today = 9.2     Ordering Provider: Dr Reilly  Order Exp:Q 6 months - appt set up, another dose in therapy plan available     Pt tolerated injection without difficulty or complaint. Reviewed next apt date/time: yes - Dec 2025      Education Record  Learner:  Patient  Disease / Diagnosis: osteopenic - AI induced bone loss  Barriers / Limitations:  None  Method:  Brief focused  General Topics:  Procedure and Plan of care reviewed  Outcome:  Shows understanding

## 2025-07-01 ENCOUNTER — LAB ENCOUNTER (OUTPATIENT)
Dept: LAB | Age: 50
End: 2025-07-01
Attending: INTERNAL MEDICINE
Payer: COMMERCIAL

## 2025-07-01 DIAGNOSIS — R53.82 CHRONIC FATIGUE: ICD-10-CM

## 2025-07-01 LAB
ALBUMIN SERPL-MCNC: 4.8 G/DL (ref 3.2–4.8)
ALBUMIN/GLOB SERPL: 2.1 {RATIO} (ref 1–2)
ALP LIVER SERPL-CCNC: 42 U/L (ref 41–108)
ALT SERPL-CCNC: 26 U/L (ref 10–49)
ANION GAP SERPL CALC-SCNC: 5 MMOL/L (ref 0–18)
AST SERPL-CCNC: 27 U/L (ref ?–34)
BASOPHILS # BLD AUTO: 0.03 X10(3) UL (ref 0–0.2)
BASOPHILS NFR BLD AUTO: 0.5 %
BILIRUB SERPL-MCNC: 0.5 MG/DL (ref 0.3–1.2)
BUN BLD-MCNC: 19 MG/DL (ref 9–23)
BUN/CREAT SERPL: 22.1 (ref 10–20)
CALCIUM BLD-MCNC: 9.7 MG/DL (ref 8.7–10.4)
CHLORIDE SERPL-SCNC: 106 MMOL/L (ref 98–112)
CO2 SERPL-SCNC: 27 MMOL/L (ref 21–32)
CREAT BLD-MCNC: 0.86 MG/DL (ref 0.55–1.02)
DEPRECATED HBV CORE AB SER IA-ACNC: 53 NG/ML (ref 50–306)
DEPRECATED RDW RBC AUTO: 40.1 FL (ref 35.1–46.3)
EGFRCR SERPLBLD CKD-EPI 2021: 82 ML/MIN/1.73M2 (ref 60–?)
EOSINOPHIL # BLD AUTO: 0.01 X10(3) UL (ref 0–0.7)
EOSINOPHIL NFR BLD AUTO: 0.2 %
ERYTHROCYTE [DISTWIDTH] IN BLOOD BY AUTOMATED COUNT: 11.9 % (ref 11–15)
FASTING STATUS PATIENT QL REPORTED: NO
GLOBULIN PLAS-MCNC: 2.3 G/DL (ref 2–3.5)
GLUCOSE BLD-MCNC: 96 MG/DL (ref 70–99)
HCT VFR BLD AUTO: 40.3 % (ref 35–48)
HGB BLD-MCNC: 13.2 G/DL (ref 12–16)
IMM GRANULOCYTES # BLD AUTO: 0 X10(3) UL (ref 0–1)
IMM GRANULOCYTES NFR BLD: 0 %
IRON SATN MFR SERPL: 26 % (ref 15–50)
IRON SERPL-MCNC: 78 UG/DL (ref 50–170)
LYMPHOCYTES # BLD AUTO: 1.7 X10(3) UL (ref 1–4)
LYMPHOCYTES NFR BLD AUTO: 28 %
MCH RBC QN AUTO: 29.9 PG (ref 26–34)
MCHC RBC AUTO-ENTMCNC: 32.8 G/DL (ref 31–37)
MCV RBC AUTO: 91.2 FL (ref 80–100)
MONOCYTES # BLD AUTO: 0.5 X10(3) UL (ref 0.1–1)
MONOCYTES NFR BLD AUTO: 8.2 %
NEUTROPHILS # BLD AUTO: 3.83 X10 (3) UL (ref 1.5–7.7)
NEUTROPHILS # BLD AUTO: 3.83 X10(3) UL (ref 1.5–7.7)
NEUTROPHILS NFR BLD AUTO: 63.1 %
OSMOLALITY SERPL CALC.SUM OF ELEC: 288 MOSM/KG (ref 275–295)
PLATELET # BLD AUTO: 223 10(3)UL (ref 150–450)
POTASSIUM SERPL-SCNC: 3.9 MMOL/L (ref 3.5–5.1)
PROT SERPL-MCNC: 7.1 G/DL (ref 5.7–8.2)
RBC # BLD AUTO: 4.42 X10(6)UL (ref 3.8–5.3)
SODIUM SERPL-SCNC: 138 MMOL/L (ref 136–145)
TOTAL IRON BINDING CAPACITY: 300 UG/DL (ref 250–425)
TRANSFERRIN SERPL-MCNC: 227 MG/DL (ref 250–380)
TSI SER-ACNC: 0.9 UIU/ML (ref 0.55–4.78)
WBC # BLD AUTO: 6.1 X10(3) UL (ref 4–11)

## 2025-07-01 PROCEDURE — 82607 VITAMIN B-12: CPT

## 2025-07-01 PROCEDURE — 83540 ASSAY OF IRON: CPT

## 2025-07-01 PROCEDURE — 36415 COLL VENOUS BLD VENIPUNCTURE: CPT

## 2025-07-01 PROCEDURE — 84443 ASSAY THYROID STIM HORMONE: CPT

## 2025-07-01 PROCEDURE — 84466 ASSAY OF TRANSFERRIN: CPT

## 2025-07-01 PROCEDURE — 82728 ASSAY OF FERRITIN: CPT

## 2025-07-01 PROCEDURE — 80053 COMPREHEN METABOLIC PANEL: CPT

## 2025-07-01 PROCEDURE — 82746 ASSAY OF FOLIC ACID SERUM: CPT

## 2025-07-01 PROCEDURE — 85025 COMPLETE CBC W/AUTO DIFF WBC: CPT

## 2025-07-02 LAB
FOLATE SERPL-MCNC: 14.4 NG/ML (ref 5.4–?)
VIT B12 SERPL-MCNC: 299 PG/ML (ref 211–911)

## (undated) DEVICE — BLADELESS OBTURATOR: Brand: WECK VISTA

## (undated) DEVICE — DRAIN RESERVOIR RELIAVAC 100CC

## (undated) DEVICE — SUTURE VICRYL 2-0 SH

## (undated) DEVICE — SUTURE VICRYL 2-0

## (undated) DEVICE — SUCTION CANISTER, 3000CC,SAFELINER: Brand: DEROYAL

## (undated) DEVICE — SUTURE VICRYL 3-0 SH

## (undated) DEVICE — DRAPE SHEET LAVH 124X112X30

## (undated) DEVICE — SOL  .9 1000ML BTL

## (undated) DEVICE — DRAPE SHEET LG

## (undated) DEVICE — MEDI-VAC NON-CONDUCTIVE SUCTION TUBING: Brand: CARDINAL HEALTH

## (undated) DEVICE — LAPAROVUE VISIBILITY SYSTEM LAPAROSCOPIC SOLUTIONS: Brand: LAPAROVUE

## (undated) DEVICE — 3M™ STERI-STRIP™ REINFORCED ADHESIVE SKIN CLOSURES, R1548, 1 IN X 5 IN (25 MM X 125 MM), 4 STRIPS/ENVELOPE: Brand: 3M™ STERI-STRIP™

## (undated) DEVICE — 60 ML SYRINGE LUER-LOCK TIP: Brand: MONOJECT

## (undated) DEVICE — DRESSING FOAM TOPIFOAM

## (undated) DEVICE — GOWN SURG AERO BLUE PERF LG

## (undated) DEVICE — Device

## (undated) DEVICE — SUTURE VICRYL 0 J340H

## (undated) DEVICE — ARM DRAPE

## (undated) DEVICE — SUTURE CHROMIC GUT 5-0 P-3

## (undated) DEVICE — STANDARD HYPODERMIC NEEDLE,POLYPROPYLENE HUB: Brand: MONOJECT

## (undated) DEVICE — PROXIMATE SKIN STAPLERS (35 WIDE) CONTAINS 35 STAINLESS STEEL STAPLES (FIXED HEAD): Brand: PROXIMATE

## (undated) DEVICE — CLIP MED INTNL HMCLP TNTLM

## (undated) DEVICE — BANDAGE ROLL,100% COTTON, 6 PLY, LARGE: Brand: KERLIX

## (undated) DEVICE — 3M™ IOBAN™ 2 ANTIMICROBIAL INCISE DRAPE 6650EZ: Brand: IOBAN™ 2

## (undated) DEVICE — TIP COVER ACCESSORY

## (undated) DEVICE — SUTURE ETHILON 3-0 669H

## (undated) DEVICE — COLUMN DRAPE

## (undated) DEVICE — ROBOTIC: Brand: MEDLINE INDUSTRIES, INC.

## (undated) DEVICE — LINE MNTR ADLT SET O2 INTMD

## (undated) DEVICE — SPONGE: SPECIALTY PEANUT XR 100/CS: Brand: MEDICAL ACTION INDUSTRIES

## (undated) DEVICE — MAJOR GENERAL: Brand: MEDLINE INDUSTRIES, INC.

## (undated) DEVICE — FLEXIBLE YANKAUER,MEDIUM TIP, NO VACUUM CONTROL: Brand: ARGYLE

## (undated) DEVICE — SUTURE PASSOR WITH GUIDE

## (undated) DEVICE — KIT CLEAN ENDOKIT 1.1OZ GOWNX2

## (undated) DEVICE — SUTURE MONOCRYL 4-0 PS-2

## (undated) DEVICE — AIRSEAL 5 MM ACCESS PORT AND LOW PROFILE OBTURATOR WITH BLADELESS OPTICAL TIP, 120 MM LENGTH: Brand: AIRSEAL

## (undated) DEVICE — ADHESIVE MASTISOL 2/3CC VL

## (undated) DEVICE — 3M™ BAIR HUGGER® UNDERBODY BLANKET, FULL ACCESS, 10 PER CASE 63500: Brand: BAIR HUGGER™

## (undated) DEVICE — Device: Brand: MICROAIRE®

## (undated) DEVICE — SUPER SPONGES,MEDIUM: Brand: KERLIX

## (undated) DEVICE — GAMMEX® PI HYBRID SIZE 7.5, STERILE POWDER-FREE SURGICAL GLOVE, POLYISOPRENE AND NEOPRENE BLEND: Brand: GAMMEX

## (undated) DEVICE — MONOPOLAR CURVED SCISSORS: Brand: ENDOWRIST

## (undated) DEVICE — VIOLET BRAIDED (POLYGLACTIN 910), SYNTHETIC ABSORBABLE SUTURE: Brand: COATED VICRYL

## (undated) DEVICE — NEEDLE HPO 18GA 1.5IN ECLPS

## (undated) DEVICE — PEN: MARKING STD PT 100/CS: Brand: MEDICAL ACTION INDUSTRIES

## (undated) DEVICE — USE ITEM #176901

## (undated) DEVICE — SUTURE VICRYL 3-0 J497G

## (undated) DEVICE — ASPIRATION TUBING SET, DISPOSABLE: Brand: MICROAIRE®

## (undated) DEVICE — CLIP SM INTNL HMCLP TNTLM ESCP

## (undated) DEVICE — SUTURE PDS II 2-0 CT-2

## (undated) DEVICE — BETADINE SOL 32 OZ 10% POVIDON

## (undated) DEVICE — STERILE TETRA-FLEX CF, ELASTIC BANDAGE LATEX FREE 6IN X5.5 YD: Brand: TETRA-FLEX™CF

## (undated) DEVICE — DEVICE FAT TISSUE COLL REVOLVE

## (undated) DEVICE — ABDOMINAL BINDER: Brand: DEROYAL

## (undated) DEVICE — 1010 S-DRAPE TOWEL DRAPE 10/BX: Brand: STERI-DRAPE™

## (undated) DEVICE — Device: Brand: MEDEX

## (undated) DEVICE — VEST SRG 3 MED CUP R/L

## (undated) DEVICE — CHLORAPREP 26ML APPLICATOR

## (undated) DEVICE — UNDYED BRAIDED (POLYGLACTIN 910), SYNTHETIC ABSORBABLE SUTURE: Brand: COATED VICRYL

## (undated) DEVICE — 6 ML SYRINGE LUER-LOCK TIP: Brand: MONOJECT

## (undated) DEVICE — INSUFFLATION NEEDLE TO ESTABLISH PNEUMOPERITONEUM.: Brand: INSUFFLATION NEEDLE

## (undated) DEVICE — BRA SURG ELIZ PINK M

## (undated) DEVICE — SUTURE PDS II 4-0 PS-2

## (undated) DEVICE — SOL  .9 3000ML

## (undated) DEVICE — ELECTRO LUBE IS A SINGLE PATIENT USE DEVICE THAT IS INTENDED TO BE USED ON ELECTROSURGICAL ELECTRODES TO REDUCE STICKING.: Brand: KEY SURGICAL ELECTRO LUBE

## (undated) DEVICE — PLASTC TOOMEY SYRNG DISP

## (undated) DEVICE — MINOR GENERAL: Brand: MEDLINE INDUSTRIES, INC.

## (undated) DEVICE — FENESTRATED BIPOLAR FORCEPS: Brand: ENDOWRIST

## (undated) DEVICE — CASED DISP BIPOLAR CORD

## (undated) DEVICE — DERMABOND LIQUID ADHESIVE

## (undated) DEVICE — 60 ML SYRINGE REGULAR TIP: Brand: MONOJECT

## (undated) DEVICE — TRAY SKIN PREP PVP-1

## (undated) DEVICE — SYRINGE MNJCT 35ML LF STRL LL

## (undated) DEVICE — ENCORE® LATEX ACCLAIM SIZE 8.5, STERILE LATEX POWDER-FREE SURGICAL GLOVE: Brand: ENCORE

## (undated) DEVICE — CANNULA SEAL

## (undated) DEVICE — BLAKE SILICONE DRAIN, 15 FR ROUND, HUBLESS WITH 3/16" TROCAR: Brand: BLAKE

## (undated) DEVICE — TISSUE RETRIEVAL SYSTEM: Brand: INZII RETRIEVAL SYSTEM

## (undated) DEVICE — AIRSEAL 8 MM ACCESS PORT AND LOW PROFILE OBTURATOR WITH BLADELESS OPTICAL TIP, 120 MM LENGTH: Brand: AIRSEAL

## (undated) DEVICE — COVER PRB NEOGUARD 30X2.6CM US

## (undated) DEVICE — DRESSING BIOPATCH 1X4 CNTR

## (undated) DEVICE — TIP BOVIE 4\" MEGADYNE

## (undated) DEVICE — TRI-LUMEN FILTERED TUBE SET WITH ACTIVATED CHARCOAL FILTER: Brand: AIRSEAL

## (undated) DEVICE — KIT ENDO ORCAPOD 160/180/190

## (undated) DEVICE — SUTURE VICRYL 0 UR-6

## (undated) DEVICE — CAUTERY BLADE 2IN INS E1455

## (undated) DEVICE — SUTURE PLAIN GUT 5-0 PC-1

## (undated) DEVICE — CONTAINER SPEC STR 4OZ GRY LID

## (undated) DEVICE — EXOFIN TISSUE ADHESIVE 1.0ML

## (undated) DEVICE — ENCORE® PERRY STYLE 42 PF SIZE 6.5, STERILE LATEX POWDER-FREE SURGICAL GLOVE: Brand: ENCORE

## (undated) NOTE — Clinical Note
Dear Dr. Chris del valle for referring Rownea Roca to the [facility] in [address] [place] [address]. Sophy Mcmillan, NP

## (undated) NOTE — LETTER
[address] [place] [address] Rd, [place], [place]     AUTHORIZATION FOR SURGICAL OPERATION OR PROCEDURE    I hereby authorize Simon Sifuentes MD, my Physician(s) and whomever may be designated as the doctor's Assistant, to perform the followin own blood, or a directed donor transfusion, I will discuss this with my Physician. 4. I consent to the photographing of procedure(s) to be performed for the purposes of advancing medicine, science and/or education, provided my identity is not revealed.  If _______________________________________________________________ ____________________________  (Witness signature)                                                                                                  (Date)                                (Time)

## (undated) NOTE — LETTER
Irina Johnson [address]  [facility] [address], [place], [place]  [address]  INFORMED CONSENT FOR TRANSFUSION OF BLOOD OR BLOOD PRODUCTS  My physician has informed me of the nature, purpose, benefits and risks of transfusion for blood and blood components that ______________________________________________  (Signature of Patient)                                                            (Responsible party in case of Minor,

## (undated) NOTE — LETTER
[address], [place], [place]     AUTHORIZATION FOR SURGICAL OPERATION OR PROCEDURE    I hereby authorize Dr. Mathew Vásquez MD, my Physician(s) and whomever may be designated as the doctor's Assistant, to perform the f my own blood, or a directed donor transfusion, I will discuss this with my Physician. 4. I consent to the photographing of procedure(s) to be performed for the purposes of advancing medicine, science and/or education, provided my identity is not revealed. _______________________________________________________________ ____________________________  (Witness signature)                                                                                                  (Date)                                (Time)

## (undated) NOTE — LETTER
[facility]  Administration of Anesthesia  1. I, [address], or _________________________________ acting on her behalf, (Patient) (Dependent/Representative) request to receive anesthesia for my pending procedure/operation/treatment.   A infections, high spinal block, spinal bleeding, seizure, cardiac arrest and death. 7. AWARENESS: I understand that it is possible (but unlikely) to have explicit memory of events from the operating room while under general anesthesia.   8. ELECTROCONVULSIV unconscious pt /Relationship    My signature below affirms that prior to the time of the procedure, I have explained to the patient and/or his/her guardian, the risks and benefits of undergoing anesthesia, as well as any reasonable alternatives.     _______

## (undated) NOTE — LETTER
[address]  [address] [place], [place]  Authorization for Surgical Operation and Procedure                                                                                           1. I hereby authorize Alissa Miller MD, my physician and his/her assistants (if applicable), which may include medical students, residents, and/or fellows, to perform the following surgical operation/ procedure and administer such anesthesia as may be determined necessary by my physician: Operation/Procedure name (s) COLONOSCOPY on [address]   2. I recognize that during the surgical operation/procedure, unforeseen conditions may necessitate additional or different procedures than those listed above. I, therefore, further authorize and request that the above-named surgeon, assistants, or designees perform such procedures as are, in their judgment, necessary and desirable. 3.   My surgeon/physician has discussed prior to my surgery the potential benefits, risks and side effects of this procedure; the likelihood of achieving goals; and potential problems that might occur during recuperation. They also discussed reasonable alternatives to the procedure, including risks, benefits, and side effects related to the alternatives and risks related to not receiving this procedure. I have had all my questions answered and I acknowledge that no guarantee has been made as to the result that may be obtained. 4.   Should the need arise during my operation/procedure, which includes change of level of care prior to discharge, I also consent to the administration of blood and/or blood products. Further, I understand that despite careful testing and screening of blood or blood products by collecting agencies, I may still be subject to ill effects as a result of receiving a blood transfusion and/or blood products.   The following are some, but not all, of the potential risks that can occur: fever and allergic reactions, hemolytic reactions, transmission of diseases such as Hepatitis, AIDS and Cytomegalovirus (CMV) and fluid overload. In the event that I wish to have an autologous transfusion of my own blood, or a directed donor transfusion, I will discuss this with my physician. Check only if Refusing Blood or Blood Products  I understand refusal of blood or blood products as deemed necessary by my physician may have serious consequences to my condition to include possible death. I hereby assume responsibility for my refusal and release the hospital, its personnel, and my physicians from any responsibility for the consequences of my refusal.    o  Refuse   5. I authorize the use of any specimen, organs, tissues, body parts or foreign objects that may be removed from my body during the operation/procedure for diagnosis, research or teaching purposes and their subsequent disposal by hospital authorities. I also authorize the release of specimen test results and/or written reports to my treating physician on the hospital medical staff or other referring or consulting physicians involved in my care, at the discretion of the Pathologist or my treating physician. 6.   I consent to the photographing or videotaping of the operations or procedures to be performed, including appropriate portions of my body for medical, scientific, or educational purposes, provided my identity is not revealed by the pictures or by descriptive texts accompanying them. If the procedure has been photographed/videotaped, the surgeon will obtain the original picture, image, videotape or CD. The hospital will not be responsible for storage, release or maintenance of the picture, image, tape or CD.    7.   I consent to the presence of a  or observers in the operating room as deemed necessary by my physician or their designees.     8.   I recognize that in the event my procedure results in extended X-Ray/fluoroscopy time, I may develop a skin reaction. 9. If I have a Do Not Attempt Resuscitation (DNAR) order in place, that status will be suspended while in the operating room, procedural suite, and during the recovery period unless otherwise explicitly stated by me (or a person authorized to consent on my behalf). The surgeon or my attending physician will determine when the applicable recovery period ends for purposes of reinstating the DNAR order. 10. Patients having a sterilization procedure: I understand that if the procedure is successful the results will be permanent and it will therefore be impossible for me to inseminate, conceive, or bear children. I also understand that the procedure is intended to result in sterility, although the result has not been guaranteed. 11. I acknowledge that my physician has explained sedation/analgesia administration to me including the risk and benefits I consent to the administration of sedation/analgesia as may be necessary or desirable in the judgment of my physician. I CERTIFY THAT I HAVE READ AND FULLY UNDERSTAND THE ABOVE CONSENT TO OPERATION and/or OTHER PROCEDURE.     _________________________________________ _________________________________     ___________________________________  Signature of Patient     Signature of Responsible Person                   Printed Name of Responsible Person                              _________________________________________ ______________________________        ___________________________________  Signature of Witness         Date  Time         Relationship to Patient    STATEMENT OF PHYSICIAN My signature below affirms that prior to the time of the procedure; I have explained to the patient and/or his/her legal representative, the risks and benefits involved in the proposed treatment and any reasonable alternative to the proposed treatment.  I have also explained the risks and benefits involved in refusal of the proposed treatment and alternatives to the proposed treatment and have answered the patient's questions.  If I have a significant financial interest in a co-management agreement or a significant financial interest in any product or implant, or other significant relationship used in this procedure/surgery, I have disclosed this and had a discussion with my patient.     _______________________________________________________________ _____________________________  Sivan Shin Physician)                                                                                         (Date)                                   (Time)  Patient Name: Mily Bui    : 3/12/1975   Printed: 2023      Medical Record #: [id number]                                              Page 1 of 1

## (undated) NOTE — LETTER
November 21, 2018    Shanon De      Dear [facility]:    The following are the results of your recent tests. Please review the list of test results.   Your result is the value on the left; we have also supplied the range

## (undated) NOTE — LETTER
[address] [place] [address] Rd, [place], [place]     AUTHORIZATION FOR SURGICAL OPERATION OR PROCEDURE    I hereby authorize Dr. Lisa Weaver MD, my Physician(s) and whomever may be designated as the doctor's Assistant, to perform the f overload. In the event that I wish to have an autologous transfusion of my own blood, or a directed donor transfusion, I will discuss this with my Physician.   4. I consent to the photographing of procedure(s) to be performed for the purposes of advancing m (Responsible person in case of minor or unconscious patient)   (Relationship to Patient)    _______________________________________________________________ ____________________________  (Witness signature)